# Patient Record
Sex: MALE | Race: BLACK OR AFRICAN AMERICAN | Employment: OTHER | URBAN - METROPOLITAN AREA
[De-identification: names, ages, dates, MRNs, and addresses within clinical notes are randomized per-mention and may not be internally consistent; named-entity substitution may affect disease eponyms.]

---

## 2019-04-22 ENCOUNTER — ANESTHESIA EVENT (OUTPATIENT)
Dept: ENDOSCOPY | Age: 64
End: 2019-04-22
Payer: MEDICARE

## 2019-04-22 ENCOUNTER — ANESTHESIA (OUTPATIENT)
Dept: ENDOSCOPY | Age: 64
End: 2019-04-22
Payer: MEDICARE

## 2019-04-22 ENCOUNTER — HOSPITAL ENCOUNTER (OUTPATIENT)
Age: 64
Setting detail: OUTPATIENT SURGERY
Discharge: HOME | End: 2019-04-22
Attending: COLON & RECTAL SURGERY | Admitting: COLON & RECTAL SURGERY
Payer: MEDICARE

## 2019-04-22 VITALS
WEIGHT: 265 LBS | BODY MASS INDEX: 37.1 KG/M2 | HEIGHT: 71 IN | DIASTOLIC BLOOD PRESSURE: 86 MMHG | RESPIRATION RATE: 16 BRPM | SYSTOLIC BLOOD PRESSURE: 160 MMHG | HEART RATE: 58 BPM | OXYGEN SATURATION: 98 % | TEMPERATURE: 98.4 F

## 2019-04-22 PROCEDURE — 77030009426 HC FCPS BIOP ENDOSC BSC -B: Performed by: COLON & RECTAL SURGERY

## 2019-04-22 PROCEDURE — 77030013992 HC SNR POLYP ENDOSC BSC -B: Performed by: COLON & RECTAL SURGERY

## 2019-04-22 PROCEDURE — 76040000007: Performed by: COLON & RECTAL SURGERY

## 2019-04-22 PROCEDURE — 77030003657 HC NDL SCLER BSC -B: Performed by: COLON & RECTAL SURGERY

## 2019-04-22 PROCEDURE — 77030027957 HC TBNG IRR ENDOGTR BUSS -B: Performed by: COLON & RECTAL SURGERY

## 2019-04-22 PROCEDURE — 76060000032 HC ANESTHESIA 0.5 TO 1 HR: Performed by: COLON & RECTAL SURGERY

## 2019-04-22 PROCEDURE — 88305 TISSUE EXAM BY PATHOLOGIST: CPT

## 2019-04-22 PROCEDURE — 74011250636 HC RX REV CODE- 250/636

## 2019-04-22 RX ORDER — ATROPINE SULFATE 0.1 MG/ML
0.5 INJECTION INTRAVENOUS
Status: DISCONTINUED | OUTPATIENT
Start: 2019-04-22 | End: 2019-04-22 | Stop reason: HOSPADM

## 2019-04-22 RX ORDER — HYDROCHLOROTHIAZIDE 12.5 MG/1
12.5 CAPSULE ORAL DAILY
COMMUNITY
End: 2020-04-10 | Stop reason: SDUPTHER

## 2019-04-22 RX ORDER — DEXTROMETHORPHAN/PSEUDOEPHED 2.5-7.5/.8
1.2 DROPS ORAL
Status: DISCONTINUED | OUTPATIENT
Start: 2019-04-22 | End: 2019-04-22 | Stop reason: HOSPADM

## 2019-04-22 RX ORDER — SODIUM CHLORIDE 0.9 % (FLUSH) 0.9 %
5-40 SYRINGE (ML) INJECTION EVERY 8 HOURS
Status: DISCONTINUED | OUTPATIENT
Start: 2019-04-22 | End: 2019-04-22 | Stop reason: HOSPADM

## 2019-04-22 RX ORDER — SODIUM CHLORIDE 0.9 % (FLUSH) 0.9 %
5-40 SYRINGE (ML) INJECTION AS NEEDED
Status: DISCONTINUED | OUTPATIENT
Start: 2019-04-22 | End: 2019-04-22 | Stop reason: HOSPADM

## 2019-04-22 RX ORDER — FLUMAZENIL 0.1 MG/ML
0.2 INJECTION INTRAVENOUS
Status: DISCONTINUED | OUTPATIENT
Start: 2019-04-22 | End: 2019-04-22 | Stop reason: HOSPADM

## 2019-04-22 RX ORDER — LIDOCAINE HYDROCHLORIDE 20 MG/ML
INJECTION, SOLUTION EPIDURAL; INFILTRATION; INTRACAUDAL; PERINEURAL AS NEEDED
Status: DISCONTINUED | OUTPATIENT
Start: 2019-04-22 | End: 2019-04-22 | Stop reason: HOSPADM

## 2019-04-22 RX ORDER — EPINEPHRINE 0.1 MG/ML
1 INJECTION INTRACARDIAC; INTRAVENOUS
Status: DISCONTINUED | OUTPATIENT
Start: 2019-04-22 | End: 2019-04-22 | Stop reason: HOSPADM

## 2019-04-22 RX ORDER — NALOXONE HYDROCHLORIDE 0.4 MG/ML
0.4 INJECTION, SOLUTION INTRAMUSCULAR; INTRAVENOUS; SUBCUTANEOUS
Status: DISCONTINUED | OUTPATIENT
Start: 2019-04-22 | End: 2019-04-22 | Stop reason: HOSPADM

## 2019-04-22 RX ORDER — PROPOFOL 10 MG/ML
INJECTION, EMULSION INTRAVENOUS AS NEEDED
Status: DISCONTINUED | OUTPATIENT
Start: 2019-04-22 | End: 2019-04-22 | Stop reason: HOSPADM

## 2019-04-22 RX ORDER — ERGOCALCIFEROL 1.25 MG/1
50000 CAPSULE ORAL
COMMUNITY

## 2019-04-22 RX ORDER — TRIPROLIDINE/PSEUDOEPHEDRINE 2.5MG-60MG
TABLET ORAL
COMMUNITY
End: 2020-04-09 | Stop reason: ALTCHOICE

## 2019-04-22 RX ORDER — NAPROXEN 500 MG/1
500 TABLET ORAL 2 TIMES DAILY WITH MEALS
COMMUNITY
End: 2020-04-10 | Stop reason: SDUPTHER

## 2019-04-22 RX ORDER — SODIUM CHLORIDE 9 MG/ML
INJECTION, SOLUTION INTRAVENOUS
Status: DISCONTINUED | OUTPATIENT
Start: 2019-04-22 | End: 2019-04-22 | Stop reason: HOSPADM

## 2019-04-22 RX ADMIN — PROPOFOL 100 MG: 10 INJECTION, EMULSION INTRAVENOUS at 09:02

## 2019-04-22 RX ADMIN — PROPOFOL 50 MG: 10 INJECTION, EMULSION INTRAVENOUS at 09:24

## 2019-04-22 RX ADMIN — PROPOFOL 50 MG: 10 INJECTION, EMULSION INTRAVENOUS at 09:09

## 2019-04-22 RX ADMIN — PROPOFOL 50 MG: 10 INJECTION, EMULSION INTRAVENOUS at 09:20

## 2019-04-22 RX ADMIN — PROPOFOL 50 MG: 10 INJECTION, EMULSION INTRAVENOUS at 09:06

## 2019-04-22 RX ADMIN — PROPOFOL 30 MG: 10 INJECTION, EMULSION INTRAVENOUS at 09:10

## 2019-04-22 RX ADMIN — PROPOFOL 50 MG: 10 INJECTION, EMULSION INTRAVENOUS at 09:04

## 2019-04-22 RX ADMIN — SODIUM CHLORIDE: 9 INJECTION, SOLUTION INTRAVENOUS at 08:55

## 2019-04-22 RX ADMIN — LIDOCAINE HYDROCHLORIDE 50 MG: 20 INJECTION, SOLUTION EPIDURAL; INFILTRATION; INTRACAUDAL; PERINEURAL at 09:02

## 2019-04-22 RX ADMIN — PROPOFOL 50 MG: 10 INJECTION, EMULSION INTRAVENOUS at 09:15

## 2019-04-22 NOTE — OP NOTES
Graham Manzano  [id number]  1955    Colonoscopy Operative Report    Procedure Type:   Colonoscopy --screening     Pre-operative Diagnosis:  See indication above. Post-operative Diagnosis:  See findings below. Surgeon:  Faiza Faustin MD    Referring Provider: Bill Gauthier MD    Sedation and Analgesia:  MAC anesthesia Propofol    Specimens Removed:  specimen 3.5 cm mass in the descending colon    EBL:  0 mL. Complications: None apparent. Indication For Procedure:    Screening colonoscopy    Findings:       Procedure Details:  After informed consent was obtained, the patient was taken to the endoscopy suite where standard monitoring devices were attached and intravenous access was established. Sedation was administered by the anesthetist as needed throughout the procedure. The patient was placed in the left lateral decubitus position, and inspection of the perineum revealed no significant external lesions. Digital rectal examination revealed no masses. The Olympus videocolonoscope was lubricated and inserted transanally into the rectum. It was advanced into the colon and without difficulty to the cecum, which was identified by the presence of the ileocecal valve and the appendiceal orifice. The quality of the bowel preparation was good, as was the overall visualization. Careful inspection was performed during withdrawal of the colonoscope. There were no apparent complications, and the patient appeared to have tolerated the procedure well. At its conclusion, he was transported to the recovery area in good condition. Impression:    polyp(s) #1, 3 mm in size, located in the ascending colon removed by snare cautery and retrieved for pathology  #2, 5 mm in size, located in proximal descending colon removed by snare cautery and retrieved for pathology  #3, 35 mm in size, located in the descending colon biopsied    Recommendations: --Await pathology. Regular diet. Resume normal medication(s). He will need to have a CT of abd/pelvis if cancer.  See Dr. Tao Armstrong in office in 10 days

## 2019-04-22 NOTE — ANESTHESIA POSTPROCEDURE EVALUATION
Post-Anesthesia Evaluation and Assessment Patient: Nisreen Burgos MRN: [id number]  SSN: [id number] YOB: 1955  Age: 70 y.o. Sex: male I have evaluated the patient and they are stable and ready for discharge from the PACU. Cardiovascular Function/Vital Signs Visit Vitals BP (!) 130/94 Pulse 61 Temp 36.9 °C (98.4 °F) Resp 18 Ht 5' 11\" (1.803 m) Wt 120.2 kg (265 lb) SpO2 (!) 0% BMI 36.96 kg/m² Patient is status post MAC anesthesia for Procedure(s): 
COLONOSCOPY 
ENDOSCOPIC POLYPECTOMY 
COLON BIOPSY INJECTION. Nausea/Vomiting: None Postoperative hydration reviewed and adequate. Pain: 
Pain Scale 1: Numeric (0 - 10) (04/22/19 2330) Pain Intensity 1: 0 (04/22/19 4150) Managed Neurological Status: At baseline Mental Status, Level of Consciousness: Alert and  oriented to person, place, and time Pulmonary Status:  
O2 Device: Nasal cannula (04/22/19 0901) Adequate oxygenation and airway patent Complications related to anesthesia: None Post-anesthesia assessment completed. No concerns Signed By: Viviane Gitelman, MD   
 April 22, 2019 Procedure(s): 
COLONOSCOPY 
ENDOSCOPIC POLYPECTOMY 
COLON BIOPSY INJECTION. MAC 
 
<BSHSIANPOST> Vitals Value Taken Time /94 4/22/2019  9:43 AM  
Temp Pulse 66 4/22/2019  9:46 AM  
Resp 35 4/22/2019  9:46 AM  
SpO2 0 % 4/22/2019  9:46 AM  
Vitals shown include unvalidated device data.

## 2019-04-22 NOTE — ROUTINE PROCESS
Miladys Kemp 1955 
[id number] Situation: 
Verbal report received from: [facility] Procedure: Procedure(s): 
COLONOSCOPY 
ENDOSCOPIC POLYPECTOMY 
COLON BIOPSY INJECTION Background: 
 
Preoperative diagnosis: SCREENING Postoperative diagnosis: 1. Descending colon polyp 2. Descending colon mass / tatooed 3. Cecal Polyps x 3 :  Dr. Madiha Egan Assistant(s): Endoscopy RN-1: Drew Current Endoscopy RN-2: Ambreen Milton RN Specimens:  
ID Type Source Tests Collected by Time Destination 1 : descenidng colon mass bx Preservative Colon, Descending  Charo Delatorre MD 4/22/2019 6127 Pathology 2 : cecal polyps Preservative Cecum  Charo Delatorre MD 4/22/2019 5187 Pathology 3 : proximal descending colon polyp Preservative Colon, Descending  Charo Delatorre MD 4/22/2019 4282 Pathology H. Pylori  no Assessment: 
Intra-procedure medications Intravenous fluids: NS@ Padmini Luis Enrique Vital signs stable yes Abdominal assessment: round and soft  radha Recommendation: 
Discharge patient per MD order yes . Return to floor na Family or Friend fam 
Permission to share finding with family or friend yes

## 2019-04-22 NOTE — PERIOP NOTES

## 2019-04-22 NOTE — DISCHARGE INSTRUCTIONS
Garrett Postin  [id number]  1955    COLON DISCHARGE INSTRUCTIONS  Discomfort:  Redness at IV site- apply warm compress to area; if redness or soreness persist- contact your physician  There may be a slight amount of blood passed from the rectum  Gaseous discomfort- walking, belching will help relieve any discomfort  You may not operate a vehicle for 12 hours  You may not engage in an occupation involving machinery or appliances for rest of today  You may not drink alcoholic beverages for at least 12 hours  Avoid making any critical decisions for at least 24 hour  DIET:   Regular diet. - however -  remember your colon is empty and a heavy meal will produce gas. Avoid these foods:  vegetables, fried / greasy foods, carbonated drinks for today    MEDICATIONS:         ACTIVITY:  You may resume your normal daily activities it is recommended that you spend the remainder of the day resting -  avoid any strenuous activity. CALL M.D. ANY SIGN OF:   Increasing pain, nausea, vomiting  Abdominal distension (swelling)  New increased bleeding (oral or rectal)  Fever (chills)  Pain in chest area  Bloody discharge from nose or mouth  Shortness of breath     Follow-up Instructions:   Call Trista Goodson MD if any questions or problems. Telephone # [phone]  Biopsy results will be available in  7 to10 days  Should have a repeat colonoscopy in 1 years. COLONOSCOPY FINDINGS:  Your colonoscopy showed: mass in descending colon and biopsy was taken and 3 other polyps removed. Will need to see Dr. Maximo Hayden in the office in 7-10 days to discuss.

## 2019-05-01 ENCOUNTER — HOSPITAL ENCOUNTER (OUTPATIENT)
Dept: CT IMAGING | Age: 64
Discharge: HOME | End: 2019-05-01
Attending: COLON & RECTAL SURGERY
Payer: MEDICARE

## 2019-05-01 DIAGNOSIS — C18.6 MALIGNANT NEOPLASM OF DESCENDING COLON (HCC): ICD-10-CM

## 2019-05-01 LAB — CREAT BLD-MCNC: 0.9 MG/DL (ref 0.6–1.3)

## 2019-05-01 PROCEDURE — 74177 CT ABD & PELVIS W/CONTRAST: CPT

## 2019-05-01 PROCEDURE — 74011636320 HC RX REV CODE- 636/320: Performed by: RADIOLOGY

## 2019-05-01 PROCEDURE — 71260 CT THORAX DX C+: CPT

## 2019-05-01 PROCEDURE — 82565 ASSAY OF CREATININE: CPT

## 2019-05-01 PROCEDURE — 74011000258 HC RX REV CODE- 258: Performed by: RADIOLOGY

## 2019-05-01 RX ORDER — SODIUM CHLORIDE 0.9 % (FLUSH) 0.9 %
10 SYRINGE (ML) INJECTION
Status: COMPLETED | OUTPATIENT
Start: 2019-05-01 | End: 2019-05-01

## 2019-05-01 RX ADMIN — IOHEXOL 50 ML: 240 INJECTION, SOLUTION INTRATHECAL; INTRAVASCULAR; INTRAVENOUS; ORAL at 14:46

## 2019-05-01 RX ADMIN — Medication 10 ML: at 14:46

## 2019-05-01 RX ADMIN — SODIUM CHLORIDE 100 ML: 900 INJECTION, SOLUTION INTRAVENOUS at 14:46

## 2019-05-01 RX ADMIN — IOPAMIDOL 100 ML: 755 INJECTION, SOLUTION INTRAVENOUS at 14:46

## 2020-03-30 ENCOUNTER — TELEPHONE (OUTPATIENT)
Dept: INTERNAL MEDICINE CLINIC | Age: 65
End: 2020-03-30

## 2020-03-30 NOTE — TELEPHONE ENCOUNTER
----- Message from Sheeba Romo sent at 3/30/2020 12:19 PM EDT -----  Regarding: Dr. Ruiz German: [phone]  Caller's first and last name: Jadiel Lozada  Reason for call: Caller would like to change the pt's appt on April 2, 2020 11:15 AM to a Virtual Visit.   Callback required yes/no and why: Yes  Best contact number(s): [phone], if no answer try [phone]  Details to clarify the request: N/A      Copy/paste envera

## 2020-03-31 NOTE — TELEPHONE ENCOUNTER
Left message for patient advising that his appointment has been changed to virtual and to contact the office if this does not work for him

## 2020-04-02 ENCOUNTER — VIRTUAL VISIT (OUTPATIENT)
Dept: INTERNAL MEDICINE CLINIC | Age: 65
End: 2020-04-02

## 2020-04-09 ENCOUNTER — VIRTUAL VISIT (OUTPATIENT)
Dept: INTERNAL MEDICINE CLINIC | Age: 65
End: 2020-04-09

## 2020-04-09 DIAGNOSIS — Z13.220 SCREENING FOR CHOLESTEROL LEVEL: ICD-10-CM

## 2020-04-09 DIAGNOSIS — Z13.1 SCREENING FOR DIABETES MELLITUS: ICD-10-CM

## 2020-04-09 DIAGNOSIS — Z11.59 NEED FOR HEPATITIS C SCREENING TEST: ICD-10-CM

## 2020-04-09 DIAGNOSIS — I10 ESSENTIAL HYPERTENSION: ICD-10-CM

## 2020-04-09 DIAGNOSIS — R76.8 POSITIVE HEPATITIS C ANTIBODY TEST: ICD-10-CM

## 2020-04-09 DIAGNOSIS — Z00.00 ENCOUNTER FOR MEDICAL EXAMINATION TO ESTABLISH CARE: Primary | ICD-10-CM

## 2020-04-09 NOTE — PROGRESS NOTES
Mr. Kacy Sandy is a new patient who is here to establish care. CC:  Establish Care   virtual visit      HPI:    74 yo male presenting to establish care   Patient is a poor historian  He has the following chronic medical issues   HTN: taking HCTZ 12.5mg daily for blood pressure does not check BP Regularly.  Denies CP and dyspnea    Patient has a hx of chronic OA and takes diclofenac and tizanidine as needed for pain     He has allergies and takes singulair 10mg daily     Colonoscopy  April 2019 Dr Jesus Harrison -   Impression:    polyp(s) #1, 3 mm in size, located in the ascending colon removed by snare cautery and retrieved for pathology  #2, 5 mm in size, located in proximal descending colon removed by snare cautery and retrieved for pathology  #3, 35 mm in size, located in the descending colon biopsied    Reviewed biopsy intramucosal adenoma - patient is following with Dr Jesus Harrison, will request records  Denies abdominal pain and rectal bleeding and weight loss and fever    Has eye appointment in June   Review of systems:  Constitutional: negative for fever, chills, weight loss, night sweats   Eyes : negative for vision changes, eye pain and discharge  Nose and Throat: negative for tinnitus, sore throat   Cardiovascular: negative for chest pain, palpitations and shortness of breath  Respiratory: negative for shortness of breath, cough and wheezing   Gastroinstestinal: negative for abdominal pain, nausea, vomiting, diarrhea, constipation, and blood in the stool  Musculoskeletal: see HPI  Genitourinary: negative for dysuria, nocturia, polyuria and hematuria   Neurologic: Negative for focal weakness, numbness or incoordination  Skin: negative for rash, pruritus  Hematologic: negative for easy bruising      Past Medical History:   Diagnosis Date    Asthma     HTN (hypertension)         Past Surgical History:   Procedure Laterality Date    COLONOSCOPY Left 4/22/2019    [facility]COPY    HX APPENDECTOMY  1995    HX COLONOSCOPY  2005       Allergies   Allergen Reactions    Gabapentin Other (comments)     \"Feels like he's fainting\"       Current Outpatient Medications on File Prior to Visit   Medication Sig Dispense Refill    ergocalciferol (VITAMIN D2) 50,000 unit capsule Take 50,000 Units by mouth.  vit B Cmplx 3-FA-Vit C-Biotin (NEPHRO LEANNE RX) 1- mg-mg-mcg tablet Take 1 Tab by mouth daily. No current facility-administered medications on file prior to visit. family history includes Colon Cancer in his mother. Social History     Socioeconomic History    Marital status: SINGLE     Spouse name: Not on file    Number of children: Not on file    Years of education: Not on file    Highest education level: Not on file   Occupational History    Not on file   Social Needs    Financial resource strain: Not on file    Food insecurity     Worry: Not on file     Inability: Not on file    Transportation needs     Medical: Not on file     Non-medical: Not on file   Tobacco Use    Smoking status: Former Smoker    Smokeless tobacco: Never Used   Substance and Sexual Activity    Alcohol use:  Yes     Alcohol/week: 5.0 standard drinks     Types: 5 Cans of beer per week    Drug use: Never    Sexual activity: Never   Lifestyle    Physical activity     Days per week: Not on file     Minutes per session: Not on file    Stress: Not on file   Relationships    Social connections     Talks on phone: Not on file     Gets together: Not on file     Attends Alevism service: Not on file     Active member of club or organization: Not on file     Attends meetings of clubs or organizations: Not on file     Relationship status: Not on file    Intimate partner violence     Fear of current or ex partner: Not on file     Emotionally abused: Not on file     Physically abused: Not on file     Forced sexual activity: Not on file   Other Topics Concern    Not on file   Social History Narrative    Not on file       There were no vitals taken for this visit. General:  Well appearing male no acute distress  Respiratory: no respiratory distress,  Speaking in full sentences  Musculoskeletal:  Normal gait. Skin:  No visible rash  Neuro:  A and OX4, fluent speech, cranial nerves grossly normal  Psych:  Normal affect      No results found for: WBC, WBCLT, HGBPOC, HGB, HGBP, HCTPOC, HCT, PHCT, RBCH, PLT, MCV, HGBEXT, HCTEXT, PLTEXT, HGBEXT, HCTEXT, PLTEXT  Lab Results   Component Value Date/Time    Sodium 140 06/30/2010 10:35 AM    Potassium 4.2 06/30/2010 10:35 AM    Chloride 103 06/30/2010 10:35 AM    CO2 28 06/30/2010 10:35 AM    Anion gap 9 06/30/2010 10:35 AM    Glucose 90 06/30/2010 10:35 AM    BUN 10 06/30/2010 10:35 AM    Creatinine 1.0 06/30/2010 10:35 AM    BUN/Creatinine ratio 10 (L) 06/30/2010 10:35 AM    GFR est AA >60 06/30/2010 10:35 AM    GFR est non-AA >60 06/30/2010 10:35 AM    Calcium 8.7 06/30/2010 10:35 AM     Lab Results   Component Value Date/Time    Cholesterol, total 197 06/30/2010 10:35 AM    HDL Cholesterol 35 06/30/2010 10:35 AM    LDL, calculated 117 (H) 06/30/2010 10:35 AM    VLDL, calculated 45 06/30/2010 10:35 AM    Triglyceride 225 (H) 06/30/2010 10:35 AM    CHOL/HDL Ratio 5.6 (H) 06/30/2010 10:35 AM     No results found for: TSH, TSH2, TSH3, TSHP, TSHEXT, TSHEXT  No results found for: HBA1C, HGBE8, GKD4FDRW, RUQ7XXSN, MOY4LMDH  No results found for: VITD3, XQVID2, XQVID3, XQVID, VD3RIA                Assessment and Plan:     1. Encounter for medical examination to establish care  - METABOLIC PANEL, COMPREHENSIVE  - CBC WITH AUTOMATED DIFF  - HEMOGLOBIN A1C W/O EAG  - LIPID PANEL  - HEPATITIS C AB    2. Essential hypertension  - on HCTZ 12.5mg daily   -recommend checking blood pressure with goal of less than  130/85 on average. Follow lo sodium diet. Given DASH diet guidelines. Recommend cardiovascular exercise regularly. Work on weight reduction.  Call with blood pressure readings.   - METABOLIC PANEL, COMPREHENSIVE  - CBC WITH AUTOMATED DIFF    3. Need for hepatitis C screening test  - HEPATITIS C AB    4. Screening for diabetes mellitus  - HEMOGLOBIN A1C W/O EAG    5. Screening for cholesterol level  - LIPID PANEL    6. Colonic mass - diagnosed by Dr Avelino Patel April 2019 , requesting records    patient reports he is following up with Dr Ernestine Gonzales conducted via real time video and audio telemedicine. The patient has been instructed that this meets HIPAA criteria and acknowledges and agrees to this method of visitation.     Page Mullen MD  04/14/20  1:59 PM    Page Mullen MD

## 2020-04-09 NOTE — PROGRESS NOTES
Reviewed record in preparation for visit and have obtained necessary documentation. Identified pt with two pt identifiers(name and ). Chief Complaint   Patient presents with   [facility] Due   Topic Date Due    Hepatitis C Test  1955    DTaP/Tdap/Td  (1 - Tdap) 1974    Shingles Vaccine (1 of 2) 2003    Colon Cancer Stool Test  2003    Cholesterol Test   2015    Glaucoma Screening   2018    Pneumococcal Vaccine (1 of 1 - PPSV23) 2018    Annual Well Visit  2019       Mr. Marc Ruiz has a reminder for a \"due or due soon\" health maintenance. I have asked that he discuss this further with his primary care provider for follow-up on this health maintenance. Coordination of Care Questionnaire:  :     1) Have you been to an emergency room, urgent care clinic since your last visit? no   Hospitalized since your last visit? no             2) Have you seen or consulted any other health care providers outside of [address] [place] [address] since your last visit? no  (Include any pap smears or colon screenings in this section.)    3) In the event something were to happen to you and you were unable to speak on your behalf, do you have an Advance Directive/ Living Will in place stating your wishes? NO    Do you have an Advance Directive on file? no    4) Are you interested in receiving information on Advance Directives? NO    Patient is accompanied by self I have received verbal consent from Kel Terrazas to discuss any/all medical information while they are present in the room.

## 2020-04-10 RX ORDER — FLUTICASONE PROPIONATE AND SALMETEROL 100; 50 UG/1; UG/1
1 POWDER RESPIRATORY (INHALATION) 2 TIMES DAILY
Qty: 1 INHALER | Refills: 2 | Status: SHIPPED | OUTPATIENT
Start: 2020-04-10 | End: 2020-04-27 | Stop reason: SDUPTHER

## 2020-04-10 RX ORDER — HYDROCHLOROTHIAZIDE 12.5 MG/1
12.5 CAPSULE ORAL DAILY
Qty: 30 CAP | Refills: 5 | Status: SHIPPED | OUTPATIENT
Start: 2020-04-10 | End: 2020-08-13 | Stop reason: SDUPTHER

## 2020-04-10 RX ORDER — ALBUTEROL SULFATE 90 UG/1
1 AEROSOL, METERED RESPIRATORY (INHALATION)
Qty: 1 INHALER | Refills: 1 | Status: SHIPPED | OUTPATIENT
Start: 2020-04-10 | End: 2020-06-18

## 2020-04-10 RX ORDER — MONTELUKAST SODIUM 10 MG/1
10 TABLET ORAL DAILY
Qty: 30 TAB | Refills: 5 | Status: SHIPPED | OUTPATIENT
Start: 2020-04-10 | End: 2020-08-13 | Stop reason: SDUPTHER

## 2020-04-10 RX ORDER — DICLOFENAC SODIUM 75 MG/1
75 TABLET, DELAYED RELEASE ORAL 2 TIMES DAILY
Qty: 60 TAB | Refills: 1 | Status: SHIPPED | OUTPATIENT
Start: 2020-04-10 | End: 2020-06-22

## 2020-04-10 RX ORDER — TIZANIDINE 2 MG/1
2 TABLET ORAL
Qty: 60 TAB | Refills: 5 | Status: SHIPPED | OUTPATIENT
Start: 2020-04-10 | End: 2020-08-13 | Stop reason: SDUPTHER

## 2020-04-10 NOTE — TELEPHONE ENCOUNTER
[facility], [address] Office Pool             Medication Refill     : 1955   ID numbers: n/a     Caller (if not patient): n/a   Relationship of caller (if not patient): n/a   Best contact number(s): [phone]   Name of medication and dosage if known: \"Tizanibine 4mg\"; \"Montelukast 10mg. \"; \"Diclofenac 75mg. \"; \"Hydrochlorot 25ml; \"Fluticasone spray\"; \"Fluticasone 500-50\"; \"Inhaler Zentolin hfa 90 mg\"; Is patient out of this medication (yes/no): yes   Pharmacy name: [facility] at [address] [place] [address].    Pharmacy listed in chart? (yes/no): yes   Pharmacy phone number: [phone]   Date of last visit: 20   Details to clarify the request: n/a     Copy/Paste  ROHIT

## 2020-04-14 ENCOUNTER — TELEPHONE (OUTPATIENT)
Dept: INTERNAL MEDICINE CLINIC | Age: 65
End: 2020-04-14

## 2020-04-14 NOTE — TELEPHONE ENCOUNTER
----- Message from Anthony Blackmon MD sent at 4/14/2020  1:56 PM EDT -----  Needs records from Dr Tosin Real in [facility] to plan for intramucosal adenoma seen on colonoscopy in APril 2019

## 2020-04-17 ENCOUNTER — TELEPHONE (OUTPATIENT)
Dept: INTERNAL MEDICINE CLINIC | Age: 65
End: 2020-04-17

## 2020-04-17 DIAGNOSIS — J40 BRONCHITIS: Primary | ICD-10-CM

## 2020-04-17 RX ORDER — AMOXICILLIN AND CLAVULANATE POTASSIUM 875; 125 MG/1; MG/1
1 TABLET, FILM COATED ORAL EVERY 12 HOURS
Qty: 14 TAB | Refills: 0 | Status: SHIPPED | OUTPATIENT
Start: 2020-04-17 | End: 2020-04-21 | Stop reason: ALTCHOICE

## 2020-04-17 RX ORDER — PREDNISONE 20 MG/1
40 TABLET ORAL
Qty: 10 TAB | Refills: 0 | Status: SHIPPED | OUTPATIENT
Start: 2020-04-17 | End: 2020-04-21 | Stop reason: SDUPTHER

## 2020-04-17 NOTE — TELEPHONE ENCOUNTER
Prescriptions were sent on 04/10 to [facility] should arrive tomorrow  Antibiotic and steroid sent to [address]   does he need a 2 day supply on BP meds ok to pend that to me  Thanks

## 2020-04-17 NOTE — TELEPHONE ENCOUNTER
[facility] [address] Office Pool             Caller: patient     Reason: The benzonatate (100mg) the patient was prescribed is not working, and he is still suffering a cough. He also reports that he has not received a callback regarding a refill request he had sent in for his medications: diclofenac (75 mg), fluticasone propionate 50 mg, montelukast 10 mg, hydrochlorot 25 mg, tizanidine 4 mg, ventolin HFA, and fluticasone propionate salmeterol 500/50. They were called in last Friday to be refilled at [facility], however nobody sent anything back to him. He has no medication at all, and has received no callback. [facility] [address] [address], [facility], [address] [place] [address] ([phone]) for easy pickup as soon as possible. He is suffering from asthma and bronchitis and would like a response soon.      Best contact number(s): [phone]     Copy/Paste  Concepcion

## 2020-04-17 NOTE — TELEPHONE ENCOUNTER
LVM for patient on home and cell numbers listed in the chart. [facility]  mail order pharmacy will arrive 04/18/2020. Prescriptions will be delivered by [employer] tracking number [id number]. Other prescriptions were sent to the [facility] on [address].

## 2020-04-21 ENCOUNTER — VIRTUAL VISIT (OUTPATIENT)
Dept: INTERNAL MEDICINE CLINIC | Age: 65
End: 2020-04-21

## 2020-04-21 DIAGNOSIS — J45.31 MILD PERSISTENT ASTHMA WITH ACUTE EXACERBATION: Primary | ICD-10-CM

## 2020-04-21 DIAGNOSIS — J40 BRONCHITIS: ICD-10-CM

## 2020-04-21 RX ORDER — PREDNISONE 20 MG/1
40 TABLET ORAL
Qty: 10 TAB | Refills: 0 | Status: SHIPPED | OUTPATIENT
Start: 2020-04-21 | End: 2020-06-01 | Stop reason: ALTCHOICE

## 2020-04-21 NOTE — PROGRESS NOTES
CC: Cough      HPI:    He is a 75 y.o. male who presents for evaluation of cough    Patient reports cough at night and smight at home. Bringing out clear phlegm   Had sinus infection last month and given antibiotic by patient first  Since then has cough  Using flonase 2 sprays in each nose   Also taking singulair 10mg daily   Also taking advair     Taking night time cough medicine OTC    Patient denies shortness of breath   Sometimes when talking voice is hoarse        This is an established visit conducted via telemedicine withphone only  The patient has been instructed that this meets HIPAA criteria and acknowledges and agrees to this method of visitation. Pursuant to the emergency declaration under the [facility] waiver authority and the [employer] Act, this Virtual Visit was conducted, with patient's consent, to reduce the patient's risk of exposure to COVID-19 and provide continuity of care for an established patient. ROS:  Constitutional: negative for fevers, chills, anorexia and weight loss  10 systems reviewed and negative other than HPI    Past Medical History:   Diagnosis Date    Asthma     HTN (hypertension)        Current Outpatient Medications on File Prior to Visit   Medication Sig Dispense Refill    fluticasone propion-salmeteroL (ADVAIR/WIXELA) 100-50 mcg/dose diskus inhaler Take 1 Puff by inhalation two (2) times a day. 1 Inhaler 2    hydroCHLOROthiazide (MICROZIDE) 12.5 mg capsule Take 1 Cap by mouth daily. 30 Cap 5    diclofenac EC (VOLTAREN) 75 mg EC tablet Take 1 Tab by mouth two (2) times a day. 60 Tab 1    albuterol (PROVENTIL HFA, VENTOLIN HFA, PROAIR HFA) 90 mcg/actuation inhaler Take 1 Puff by inhalation every six (6) hours as needed for Wheezing. 1 Inhaler 1    tiZANidine (ZANAFLEX) 2 mg tablet Take 1 Tab by mouth two (2) times daily as needed for Muscle Spasm(s).  60 Tab 5    montelukast (SINGULAIR) 10 mg tablet Take 1 Tab by mouth daily. 30 Tab 5    ergocalciferol (VITAMIN D2) 50,000 unit capsule Take 50,000 Units by mouth.  vit B Cmplx 3-FA-Vit C-Biotin (NEPHRO LEANNE RX) 1- mg-mg-mcg tablet Take 1 Tab by mouth daily. No current facility-administered medications on file prior to visit. Past Surgical History:   Procedure Laterality Date    COLONOSCOPY Left 4/22/2019    COLONOSCOPY performed by Clara Perez MD at [address] HX COLONOSCOPY  2005       Family History   Problem Relation Age of Onset    Colon Cancer Mother      Reviewed and no changes     Social History     Socioeconomic History    Marital status: SINGLE     Spouse name: Not on file    Number of children: Not on file    Years of education: Not on file    Highest education level: Not on file   Occupational History    Not on file   Social Needs    Financial resource strain: Not on file    Food insecurity     Worry: Not on file     Inability: Not on file    Transportation needs     Medical: Not on file     Non-medical: Not on file   Tobacco Use    Smoking status: Former Smoker    Smokeless tobacco: Never Used   Substance and Sexual Activity    Alcohol use:  Yes     Alcohol/week: 5.0 standard drinks     Types: 5 Cans of beer per week    Drug use: Never    Sexual activity: Never   Lifestyle    Physical activity     Days per week: Not on file     Minutes per session: Not on file    Stress: Not on file   Relationships    Social connections     Talks on phone: Not on file     Gets together: Not on file     Attends Anglican service: Not on file     Active member of club or organization: Not on file     Attends meetings of clubs or organizations: Not on file     Relationship status: Not on file    Intimate partner violence     Fear of current or ex partner: Not on file     Emotionally abused: Not on file     Physically abused: Not on file     Forced sexual activity: Not on file   Other Topics Concern    Not on file   Social History Narrative    Not on file          There were no vitals taken for this visit. Physical Examination:   Gen:no acute distress, she is speaking in full sentences           No results found for: WBC, WBCLT, HGBPOC, HGB, HGBP, HCTPOC, HCT, PHCT, RBCH, PLT, MCV, HGBEXT, HCTEXT, PLTEXT, HGBEXT, HCTEXT, PLTEXT  Lab Results   Component Value Date/Time    Sodium 140 06/30/2010 10:35 AM    Potassium 4.2 06/30/2010 10:35 AM    Chloride 103 06/30/2010 10:35 AM    CO2 28 06/30/2010 10:35 AM    Anion gap 9 06/30/2010 10:35 AM    Glucose 90 06/30/2010 10:35 AM    BUN 10 06/30/2010 10:35 AM    Creatinine 1.0 06/30/2010 10:35 AM    BUN/Creatinine ratio 10 (L) 06/30/2010 10:35 AM    GFR est AA >60 06/30/2010 10:35 AM    GFR est non-AA >60 06/30/2010 10:35 AM    Calcium 8.7 06/30/2010 10:35 AM     Lab Results   Component Value Date/Time    Cholesterol, total 197 06/30/2010 10:35 AM    HDL Cholesterol 35 06/30/2010 10:35 AM    LDL, calculated 117 (H) 06/30/2010 10:35 AM    VLDL, calculated 45 06/30/2010 10:35 AM    Triglyceride 225 (H) 06/30/2010 10:35 AM    CHOL/HDL Ratio 5.6 (H) 06/30/2010 10:35 AM     No results found for: TSH, TSH2, TSH3, TSHP, TSHEXT, TSHEXT  Lab Results   Component Value Date/Time    Prostate Specific Ag 0.5 06/30/2010 10:35 AM     No results found for: HBA1C, HGBE8, QTY0QVHY, WPZ3HYOF, KBY8MFYE  No results found for: VITD3, XQVID2, XQVID3, Pat Honer, VD3RIA    Lab Results   Component Value Date/Time    ALT (SGPT) 100 (H) 06/30/2010 10:35 AM    AST (SGOT) 57 (H) 06/30/2010 10:35 AM    Alk. phosphatase 95 06/30/2010 10:35 AM    Bilirubin, total 0.4 06/30/2010 10:35 AM           Assessment/Plan:        2. Mild persistent asthma with acute exacerbation  - advised to continue advair, singulair, flonase and albuterol as needed  - predniSONE (DELTASONE) 20 mg tablet; Take 40 mg by mouth daily (with breakfast). Dispense: 10 Tab;  Refill: Jacqui Woodward MD    This is an established visit conducted via real time phone only 14 minutes telemedicine. The patient has been instructed that this meets HIPAA criteria and acknowledges and agrees to this method of visitation.

## 2020-04-22 LAB
ALBUMIN SERPL-MCNC: 4 G/DL (ref 3.8–4.8)
ALBUMIN/GLOB SERPL: 1.5 {RATIO} (ref 1.2–2.2)
ALP SERPL-CCNC: 75 IU/L (ref 39–117)
ALT SERPL-CCNC: 36 IU/L (ref 0–44)
AST SERPL-CCNC: 32 IU/L (ref 0–40)
BASOPHILS # BLD AUTO: 0 X10E3/UL (ref 0–0.2)
BASOPHILS NFR BLD AUTO: 1 %
BILIRUB SERPL-MCNC: 0.5 MG/DL (ref 0–1.2)
BUN SERPL-MCNC: 7 MG/DL (ref 8–27)
BUN/CREAT SERPL: 7 (ref 10–24)
CALCIUM SERPL-MCNC: 8.7 MG/DL (ref 8.6–10.2)
CHLORIDE SERPL-SCNC: 99 MMOL/L (ref 96–106)
CHOLEST SERPL-MCNC: 208 MG/DL (ref 100–199)
CO2 SERPL-SCNC: 24 MMOL/L (ref 20–29)
CREAT SERPL-MCNC: 0.97 MG/DL (ref 0.76–1.27)
EOSINOPHIL # BLD AUTO: 0.4 X10E3/UL (ref 0–0.4)
EOSINOPHIL NFR BLD AUTO: 6 %
ERYTHROCYTE [DISTWIDTH] IN BLOOD BY AUTOMATED COUNT: 11.8 % (ref 11.6–15.4)
GLOBULIN SER CALC-MCNC: 2.6 G/DL (ref 1.5–4.5)
GLUCOSE SERPL-MCNC: 107 MG/DL (ref 65–99)
HBA1C MFR BLD: 5.4 % (ref 4.8–5.6)
HCT VFR BLD AUTO: 40 % (ref 37.5–51)
HCV AB S/CO SERPL IA: 9.4 S/CO RATIO (ref 0–0.9)
HDLC SERPL-MCNC: 44 MG/DL
HGB BLD-MCNC: 13.7 G/DL (ref 13–17.7)
IMM GRANULOCYTES # BLD AUTO: 0 X10E3/UL (ref 0–0.1)
IMM GRANULOCYTES NFR BLD AUTO: 0 %
LDLC SERPL CALC-MCNC: 141 MG/DL (ref 0–99)
LYMPHOCYTES # BLD AUTO: 2.7 X10E3/UL (ref 0.7–3.1)
LYMPHOCYTES NFR BLD AUTO: 45 %
MCH RBC QN AUTO: 34.9 PG (ref 26.6–33)
MCHC RBC AUTO-ENTMCNC: 34.3 G/DL (ref 31.5–35.7)
MCV RBC AUTO: 102 FL (ref 79–97)
MONOCYTES # BLD AUTO: 0.7 X10E3/UL (ref 0.1–0.9)
MONOCYTES NFR BLD AUTO: 11 %
NEUTROPHILS # BLD AUTO: 2.2 X10E3/UL (ref 1.4–7)
NEUTROPHILS NFR BLD AUTO: 37 %
PLATELET # BLD AUTO: 270 X10E3/UL (ref 150–450)
POTASSIUM SERPL-SCNC: 3.9 MMOL/L (ref 3.5–5.2)
PROT SERPL-MCNC: 6.6 G/DL (ref 6–8.5)
RBC # BLD AUTO: 3.92 X10E6/UL (ref 4.14–5.8)
SODIUM SERPL-SCNC: 140 MMOL/L (ref 134–144)
TRIGL SERPL-MCNC: 116 MG/DL (ref 0–149)
VLDLC SERPL CALC-MCNC: 23 MG/DL (ref 5–40)
WBC # BLD AUTO: 6 X10E3/UL (ref 3.4–10.8)

## 2020-04-23 NOTE — PROGRESS NOTES
Normal kidney and liver function   No diabetes  Screening for hep C is positive - patient may have antibody meaning immune and recovered or active disease further testing is necessary - labcor order for HepC viral load ordered

## 2020-04-27 RX ORDER — FLUTICASONE PROPIONATE AND SALMETEROL 100; 50 UG/1; UG/1
1 POWDER RESPIRATORY (INHALATION) 2 TIMES DAILY
Qty: 1 INHALER | Refills: 2 | Status: SHIPPED | OUTPATIENT
Start: 2020-04-27 | End: 2020-06-19 | Stop reason: SDUPTHER

## 2020-04-27 NOTE — TELEPHONE ENCOUNTER
[place], [address] [facility] [address] Office Pool             Medication Refill         Best contact number(s):[phone]       \" FLUTICASONE  50 MG \"       Is patient out of this medication (yes/no):  YES         [facility]  Pharmacy listed in chart     [address] [facility]

## 2020-05-08 NOTE — PROGRESS NOTES
Spoke with patient. Two pt identifiers confirmed. Patient advised of his most recent lab results per Dr. Richard Theodore. Pt verbalized understanding of information discussed w/ no further questions at this time.

## 2020-05-28 ENCOUNTER — TELEPHONE (OUTPATIENT)
Dept: INTERNAL MEDICINE CLINIC | Age: 65
End: 2020-05-28

## 2020-05-28 NOTE — TELEPHONE ENCOUNTER
Spoke with patient. Two pt identifiers confirmed. Patient states that he is having a lot of lower extremity edema. Patient states that elevated his feet does not seem to be helping. Patient states that he doesn't eat a lot of salt, but admits to eating chips, spam and canned vegetables. Patient states that he drinks water some but mostly juice, tea, beer and wine. Patient states that he has not noticed a change in his weight. Advised patient that he needs to change his diet, but I will also check with Dr. Em Serrato to see if she has any other recommendations. Pt verbalized understanding of information discussed w/ no further questions at this time.

## 2020-05-29 NOTE — TELEPHONE ENCOUNTER
MD Anaid Muñoz LPN   Caller: Unspecified Ada Hilton,  1:41 PM)             Agree with diet changes, he should be scheduled for in person visit next Tuesday      Spoke with patient. Two pt identifiers confirmed. Patient advised of the above information from Dr. Michelle Nolen. Patient offered an appointment with Dr. Michelle Nolen on 06/02/2020. Appointment accepted. Patient advised if anything changes or if unable to keep this appointment to call the office  Pt verbalized understanding of information discussed w/ no further questions at this time.

## 2020-06-02 ENCOUNTER — TELEPHONE (OUTPATIENT)
Dept: INTERNAL MEDICINE CLINIC | Age: 65
End: 2020-06-02

## 2020-06-02 ENCOUNTER — OFFICE VISIT (OUTPATIENT)
Dept: INTERNAL MEDICINE CLINIC | Age: 65
End: 2020-06-02

## 2020-06-02 NOTE — TELEPHONE ENCOUNTER
----- Message from Romie Holt sent at 2020  3:30 PM EDT -----  Regarding: Dr. Clarisa Edmond  Appointment not available    : 51      Caller's first and last name and relationship to patient (if not the patient): n/a  Best contact number: [phone]  Preferred date and time: as soon as possible inside the office  Scheduled appointment date and time: n/a  Reason for appointment: Pt says he over slept for his 20;   9am appt.   Details to clarify the request: Pt would like to reschedule for 1030am.      Copy/paste envera

## 2020-06-03 NOTE — TELEPHONE ENCOUNTER
Spoke with patient. Two pt identifiers confirmed. Patient offered an appointment with Dr. Richard Theodore on 06/30/2020. Appointment accepted. Patient advised if anything changes or if unable to keep this appointment to call the office  Pt verbalized understanding of information discussed w/ no further questions at this time.

## 2020-06-18 RX ORDER — ALBUTEROL SULFATE 90 UG/1
AEROSOL, METERED RESPIRATORY (INHALATION)
Qty: 17 G | Refills: 5 | Status: SHIPPED | OUTPATIENT
Start: 2020-06-18 | End: 2020-08-13 | Stop reason: SDUPTHER

## 2020-06-19 DIAGNOSIS — J45.31 MILD PERSISTENT ASTHMA WITH ACUTE EXACERBATION: Primary | ICD-10-CM

## 2020-06-19 RX ORDER — FLUTICASONE PROPIONATE AND SALMETEROL 100; 50 UG/1; UG/1
1 POWDER RESPIRATORY (INHALATION) 2 TIMES DAILY
Qty: 3 INHALER | Refills: 1 | Status: SHIPPED | OUTPATIENT
Start: 2020-06-19 | End: 2020-08-13 | Stop reason: SDUPTHER

## 2020-06-19 NOTE — TELEPHONE ENCOUNTER
CP: Matt Ferrer MD    Last appt: 6/2/2020  Future Appointments   Date Time Provider Anisa Tran   6/30/2020 11:00 AM Appa Mikki Mistry MD [facility] 87       Requested Prescriptions     Pending Prescriptions Disp Refills    fluticasone propion-salmeteroL (ADVAIR/WIXELA) 100-50 mcg/dose diskus inhaler 1 Inhaler 2     Sig: Take 1 Puff by inhalation two (2) times a day.

## 2020-06-22 RX ORDER — DICLOFENAC SODIUM 75 MG/1
TABLET, DELAYED RELEASE ORAL
Qty: 120 TAB | Refills: 0 | Status: SHIPPED | OUTPATIENT
Start: 2020-06-22 | End: 2020-08-13 | Stop reason: SDUPTHER

## 2020-06-24 ENCOUNTER — TELEPHONE (OUTPATIENT)
Dept: INTERNAL MEDICINE CLINIC | Age: 65
End: 2020-06-24

## 2020-06-24 NOTE — TELEPHONE ENCOUNTER
Spoke with patients wife Dimitris Phipps  Two pt identifiers confirmed. Dimitris Phipps advised that patients appointment on 06/30/2020 is for an in office visit. Nanette verbalized understanding of information discussed w/ no further questions at this time.

## 2020-06-24 NOTE — TELEPHONE ENCOUNTER
Patient's wife, Massiel Pierrely, states she needs a call back in reference to patient appt on 6/30/20 documented as Virtual Visit is supposed to be an In Office/Live appt & she would like to be seen the same day for an appt. Please call to discuss patient's appt.  Thank you

## 2020-06-30 ENCOUNTER — TELEPHONE (OUTPATIENT)
Dept: INTERNAL MEDICINE CLINIC | Age: 65
End: 2020-06-30

## 2020-06-30 NOTE — TELEPHONE ENCOUNTER
Spoke with patients wife. Two pt identifiers confirmed. Offered an appointment for the patient on 08/13/2020. Appointment accepted. Patient advised if anything changes or if unable to keep this appointment to call the office  Pt verbalized understanding of information discussed w/ no further questions at this time.

## 2020-06-30 NOTE — TELEPHONE ENCOUNTER
----- Message from Ivis Gtz sent at 6/30/2020  9:18 AM EDT -----  Regarding: Dr. Maulik Meyer first and last name: Nish Broussard      Reason for call:change appointment until July 2020      Callback required yes/no and why:yes      Best contact number(s):[phone]      Details to clarify the request: please reschedule appointment until July 2020      Ivis Gtz

## 2020-08-12 ENCOUNTER — TELEPHONE (OUTPATIENT)
Dept: INTERNAL MEDICINE CLINIC | Age: 65
End: 2020-08-12

## 2020-08-12 NOTE — TELEPHONE ENCOUNTER
----- Message from Suzan Jimenes sent at 8/12/2020  4:01 PM EDT -----  Regarding: Dr. Lilibeth Ramirez: [phone]  Caller's first and last name: Cash Bennett  Reason for call: Missed a phone call from provider.    Callback required yes/no and why: yes  Best contact number(s): [phone]  Details to clarify the request:n/a      Copy/paste envera

## 2020-08-13 ENCOUNTER — VIRTUAL VISIT (OUTPATIENT)
Dept: INTERNAL MEDICINE CLINIC | Age: 65
End: 2020-08-13
Payer: MEDICAID

## 2020-08-13 DIAGNOSIS — I10 ESSENTIAL HYPERTENSION: Primary | ICD-10-CM

## 2020-08-13 DIAGNOSIS — R76.8 POSITIVE HEPATITIS C ANTIBODY TEST: ICD-10-CM

## 2020-08-13 DIAGNOSIS — M25.531 RIGHT WRIST PAIN: ICD-10-CM

## 2020-08-13 DIAGNOSIS — J45.30 MILD PERSISTENT ASTHMA WITHOUT COMPLICATION: ICD-10-CM

## 2020-08-13 PROCEDURE — 99214 OFFICE O/P EST MOD 30 MIN: CPT | Performed by: INTERNAL MEDICINE

## 2020-08-13 RX ORDER — FLUTICASONE PROPIONATE 50 MCG
2 SPRAY, SUSPENSION (ML) NASAL DAILY
Qty: 3 BOTTLE | Refills: 3 | Status: SHIPPED | OUTPATIENT
Start: 2020-08-13 | End: 2020-08-13 | Stop reason: SDUPTHER

## 2020-08-13 RX ORDER — FLUTICASONE PROPIONATE 50 MCG
2 SPRAY, SUSPENSION (ML) NASAL DAILY
Qty: 3 BOTTLE | Refills: 3 | Status: SHIPPED | OUTPATIENT
Start: 2020-08-13

## 2020-08-13 RX ORDER — HYDROCHLOROTHIAZIDE 12.5 MG/1
12.5 CAPSULE ORAL DAILY
Qty: 90 CAP | Refills: 1 | Status: SHIPPED | OUTPATIENT
Start: 2020-08-13 | End: 2020-12-05

## 2020-08-13 RX ORDER — ALBUTEROL SULFATE 90 UG/1
AEROSOL, METERED RESPIRATORY (INHALATION)
Qty: 17 G | Refills: 5 | Status: SHIPPED | OUTPATIENT
Start: 2020-08-13

## 2020-08-13 RX ORDER — DICLOFENAC SODIUM 75 MG/1
TABLET, DELAYED RELEASE ORAL
Qty: 120 TAB | Refills: 0 | Status: SHIPPED | OUTPATIENT
Start: 2020-08-13 | End: 2020-09-10

## 2020-08-13 RX ORDER — TIZANIDINE 2 MG/1
2 TABLET ORAL
Qty: 60 TAB | Refills: 5 | Status: SHIPPED | OUTPATIENT
Start: 2020-08-13 | End: 2021-07-21 | Stop reason: SDUPTHER

## 2020-08-13 RX ORDER — FLUTICASONE PROPIONATE AND SALMETEROL 100; 50 UG/1; UG/1
1 POWDER RESPIRATORY (INHALATION) 2 TIMES DAILY
Qty: 3 INHALER | Refills: 1 | Status: SHIPPED | OUTPATIENT
Start: 2020-08-13 | End: 2020-12-05

## 2020-08-13 RX ORDER — MONTELUKAST SODIUM 10 MG/1
10 TABLET ORAL DAILY
Qty: 90 TAB | Refills: 1 | Status: SHIPPED | OUTPATIENT
Start: 2020-08-13 | End: 2020-12-05

## 2020-08-13 NOTE — PROGRESS NOTES
CC: Asthma  HTN     HPI:    He is a 77 y.o. male who presents for evaluation of chronic medical issues     He has a hx of HTN not monitoring BP \" It is fine\"  Takes HCTZ 12.5mg daily     For asthma this is stable taking singulair and Advair and PRN use of albuterol  Reports albuterol use is not frequent / unable to quantify frequency    Hepatitis C treated and cured     Hx of colon cancer - Followed by colon specialist Dr Temo Burgess due for visit in September     Complains of right wrist pain for months hurts when moves his wrist, denies swelling and redness    This is an established visit conducted via telemedicine real [facility] . The patient has been instructed that this meets HIPAA criteria and acknowledges and agrees to this method of visitation. Pursuant to the emergency declaration under the [address] [place] [address], [facility] waiver authority and the [place] APX Act, this Virtual Visit was conducted, with patient's consent, to reduce the patient's risk of exposure to COVID-19 and provide continuity of care for an established patient. ROS:  Constitutional: negative for fevers, chills, anorexia and weight loss  10 systems reviewed and negative other than HPI    Past Medical History:   Diagnosis Date    Asthma     HTN (hypertension)        Current Outpatient Medications on File Prior to Visit   Medication Sig Dispense Refill    ergocalciferol (VITAMIN D2) 50,000 unit capsule Take 50,000 Units by mouth.  vit B Cmplx 3-FA-Vit C-Biotin (NEPHRO LEANNE RX) 1- mg-mg-mcg tablet Take 1 Tab by mouth daily. No current facility-administered medications on file prior to visit.         Past Surgical History:   Procedure Laterality Date    COLONOSCOPY Left 4/22/2019    COLONOSCOPY performed by Carlitos Olmos MD at [address] [place] [address] HX COLONOSCOPY  2005       Family History   Problem Relation Age of Onset    Colon Cancer Mother      Reviewed and no changes     Social History     Socioeconomic History    Marital status: SINGLE     Spouse name: Not on file    Number of children: Not on file    Years of education: Not on file    Highest education level: Not on file   Occupational History    Not on file   Social Needs    Financial resource strain: Not on file    Food insecurity     Worry: Not on file     Inability: Not on file    Transportation needs     Medical: Not on file     Non-medical: Not on file   Tobacco Use    Smoking status: Former Smoker    Smokeless tobacco: Never Used   Substance and Sexual Activity    Alcohol use: Yes     Alcohol/week: 5.0 standard drinks     Types: 5 Cans of beer per week    Drug use: Never    Sexual activity: Never   Lifestyle    Physical activity     Days per week: Not on file     Minutes per session: Not on file    Stress: Not on file   Relationships    Social connections     Talks on phone: Not on file     Gets together: Not on file     Attends Faith service: Not on file     Active member of club or organization: Not on file     Attends meetings of clubs or organizations: Not on file     Relationship status: Not on file    Intimate partner violence     Fear of current or ex partner: Not on file     Emotionally abused: Not on file     Physically abused: Not on file     Forced sexual activity: Not on file   Other Topics Concern    Not on file   Social History Narrative    Not on file          There were no vitals taken for this visit.     Physical Examination:   Gen:no acute distress, she is speaking in full sentences           Lab Results   Component Value Date/Time    WBC 6.0 04/21/2020 11:07 AM    HGB 13.7 04/21/2020 11:07 AM    HCT 40.0 04/21/2020 11:07 AM    PLATELET 922 82/67/5451 11:07 AM     (H) 04/21/2020 11:07 AM     Lab Results   Component Value Date/Time    Sodium 140 04/21/2020 11:07 AM    Potassium 3.9 04/21/2020 11:07 AM    Chloride 99 04/21/2020 11:07 AM    CO2 24 04/21/2020 11:07 AM    Anion gap 9 06/30/2010 10:35 AM    Glucose 107 (H) 04/21/2020 11:07 AM    BUN 7 (L) 04/21/2020 11:07 AM    Creatinine 0.97 04/21/2020 11:07 AM    BUN/Creatinine ratio 7 (L) 04/21/2020 11:07 AM    GFR est AA 94 04/21/2020 11:07 AM    GFR est non-AA 81 04/21/2020 11:07 AM    Calcium 8.7 04/21/2020 11:07 AM     Lab Results   Component Value Date/Time    Cholesterol, total 208 (H) 04/21/2020 11:07 AM    HDL Cholesterol 44 04/21/2020 11:07 AM    LDL, calculated 141 (H) 04/21/2020 11:07 AM    VLDL, calculated 23 04/21/2020 11:07 AM    Triglyceride 116 04/21/2020 11:07 AM    CHOL/HDL Ratio 5.6 (H) 06/30/2010 10:35 AM     No results found for: TSH, TSH2, TSH3, TSHP, TSHEXT, TSHEXT  Lab Results   Component Value Date/Time    Prostate Specific Ag 0.5 06/30/2010 10:35 AM     Lab Results   Component Value Date/Time    Hemoglobin A1c 5.4 04/21/2020 11:07 AM     No results found for: VITD3, XQVID2, XQVID3, Kelly Sears, VD3RIA    Lab Results   Component Value Date/Time    ALT (SGPT) 36 04/21/2020 11:07 AM    Alk. phosphatase 75 04/21/2020 11:07 AM    Bilirubin, total 0.5 04/21/2020 11:07 AM           Assessment/Plan:      1. Essential hypertension  - reports controlled   - hydroCHLOROthiazide (MICROZIDE) 12.5 mg capsule; Take 1 Cap by mouth daily. Dispense: 90 Cap; Refill: 1    2. Positive hepatitis C antibody test  Reports treated and cured     3. Right wrist pain  - REFERRAL TO ORTHOPEDICS  - diclofenac EC (VOLTAREN) 75 mg EC tablet; TAKE 1 TABLET BY MOUTH  TWICE DAILY  Dispense: 120 Tab; Refill: 0  - tiZANidine (ZANAFLEX) 2 mg tablet; Take 1 Tab by mouth two (2) times daily as needed for Muscle Spasm(s). Dispense: 60 Tab; Refill: 5    4. Mild persistent asthma  Stable   - fluticasone propion-salmeteroL (ADVAIR/WIXELA) 100-50 mcg/dose diskus inhaler; Take 1 Puff by inhalation two (2) times a day. Dispense: 3 Inhaler;  Refill: 1  - albuterol (PROVENTIL HFA, VENTOLIN HFA, PROAIR HFA) 90 mcg/actuation inhaler; USE 1 INHALATION BY MOUTH  EVERY 6 HOURS AS NEEDED FOR WHEEZING  Dispense: 17 g; Refill: 5  - montelukast (SINGULAIR) 10 mg tablet; Take 1 Tab by mouth daily. Dispense: 90 Tab;  Refill: 1    Virtual video visit    In person exam in one month for medicare wellness, and BP check and exam      Pee Conrad MD

## 2020-08-13 NOTE — PROGRESS NOTES
Reviewed record in preparation for visit and have obtained necessary documentation. Identified pt with two pt identifiers(name and ). Chief Complaint   [facility] presents with   [facility]   Topic Date Due    DTaP/Tdap/Td  (1 - Tdap) 1974    Shingles Vaccine (1 of 2) 2003    Glaucoma Screening   2018    Pneumococcal Vaccine (1 of 1 - PPSV23) 2018    Annual Well Visit  2019    Colonoscopy  2020    Flu Vaccine  2020       Mr. Darshan Napoles has a reminder for a \"due or due soon\" health maintenance. I have asked that he discuss this further with his primary care provider for follow-up on this health maintenance. Coordination of Care Questionnaire:  :     1) Have you been to an emergency room, urgent care clinic since your last visit? no   Hospitalized since your last visit? no             2) Have you seen or consulted any other health care providers outside of [address] [place] [address] since your last visit? no  (Include any pap smears or colon screenings in this section.)    3) In the event something were to happen to you and you were unable to speak on your behalf, do you have an Advance Directive/ Living Will in place stating your wishes? NO    Do you have an Advance Directive on file? no    4) Are you interested in receiving information on Advance Directives? NO    Patient is accompanied by self I have received verbal consent from Dev Deal to discuss any/all medical information while they are present in the room.

## 2020-08-13 NOTE — TELEPHONE ENCOUNTER
Spoke with [facility]. Two pt identifiers confirmed. Advised that the missed call was a reminder call for patients appointment today with Dr. Milly Tam. Nanette verbalized understanding of information discussed w/ no further questions at this time.

## 2020-09-10 DIAGNOSIS — M25.531 RIGHT WRIST PAIN: ICD-10-CM

## 2020-09-10 RX ORDER — DICLOFENAC SODIUM 75 MG/1
TABLET, DELAYED RELEASE ORAL
Qty: 120 TAB | Refills: 5 | Status: SHIPPED | OUTPATIENT
Start: 2020-09-10 | End: 2022-01-13

## 2020-12-05 DIAGNOSIS — J45.30 MILD PERSISTENT ASTHMA WITHOUT COMPLICATION: ICD-10-CM

## 2020-12-05 DIAGNOSIS — I10 ESSENTIAL HYPERTENSION: ICD-10-CM

## 2020-12-05 RX ORDER — FLUTICASONE PROPIONATE AND SALMETEROL 100; 50 UG/1; UG/1
POWDER RESPIRATORY (INHALATION)
Qty: 180 EACH | Refills: 3 | Status: SHIPPED | OUTPATIENT
Start: 2020-12-05

## 2020-12-05 RX ORDER — HYDROCHLOROTHIAZIDE 12.5 MG/1
CAPSULE ORAL
Qty: 90 CAP | Refills: 3 | Status: SHIPPED | OUTPATIENT
Start: 2020-12-05

## 2020-12-05 RX ORDER — MONTELUKAST SODIUM 10 MG/1
TABLET ORAL
Qty: 90 TAB | Refills: 3 | Status: SHIPPED | OUTPATIENT
Start: 2020-12-05

## 2021-03-04 NOTE — PROGRESS NOTES
Reviewed record in preparation for visit and have obtained necessary documentation. Identified pt with two pt identifiers(name and ). Chief Complaint   Patient presents with    Cough       Health Maintenance Due   Topic Date Due    Hepatitis C Test  1955    DTaP/Tdap/Td  (1 - Tdap) 1974    Shingles Vaccine (1 of 2) 2003    Cholesterol Test   2015    Glaucoma Screening   2018    Pneumococcal Vaccine (1 of 1 - PPSV23) 2018    Annual Well Visit  2019    Colonoscopy  2020       Mr. Flor Wilson has a reminder for a \"due or due soon\" health maintenance. I have asked that he discuss this further with his primary care provider for follow-up on this health maintenance. Coordination of Care Questionnaire:  :     1) Have you been to an emergency room, urgent care clinic since your last visit? no   Hospitalized since your last visit? no             2) Have you seen or consulted any other health care providers outside of [address] [place] [address] since your last visit? no  (Include any pap smears or colon screenings in this section.)    3) In the event something were to happen to you and you were unable to speak on your behalf, do you have an Advance Directive/ Living Will in place stating your wishes? NO    Do you have an Advance Directive on file? no    4) Are you interested in receiving information on Advance Directives? NO    Patient is accompanied by self I have received verbal consent from Jacobo Dunlap to discuss any/all medical information while they are present in the room. Never smoker

## 2021-07-21 DIAGNOSIS — M25.531 RIGHT WRIST PAIN: ICD-10-CM

## 2021-07-21 RX ORDER — TIZANIDINE 2 MG/1
2 TABLET ORAL
Qty: 60 TABLET | Refills: 5 | Status: SHIPPED | OUTPATIENT
Start: 2021-07-21

## 2021-07-21 NOTE — TELEPHONE ENCOUNTER
Future Appointments:  No future appointments. Last Appointment With Me:  Visit date not found     Requested Prescriptions     Pending Prescriptions Disp Refills    tiZANidine (ZANAFLEX) 2 mg tablet 60 Tablet 5     Sig: Take 1 Tablet by mouth two (2) times daily as needed for Muscle Spasm(s).

## 2022-01-13 DIAGNOSIS — M25.531 RIGHT WRIST PAIN: ICD-10-CM

## 2022-01-13 RX ORDER — DICLOFENAC SODIUM 75 MG/1
TABLET, DELAYED RELEASE ORAL
Qty: 180 TABLET | Refills: 3 | Status: SHIPPED | OUTPATIENT
Start: 2022-01-13

## 2022-02-05 ENCOUNTER — APPOINTMENT (OUTPATIENT)
Dept: CT IMAGING | Age: 67
End: 2022-02-05
Attending: EMERGENCY MEDICINE
Payer: MEDICAID

## 2022-02-05 ENCOUNTER — HOSPITAL ENCOUNTER (EMERGENCY)
Age: 67
Discharge: HOME | End: 2022-02-05
Attending: EMERGENCY MEDICINE
Payer: MEDICAID

## 2022-02-05 VITALS
HEART RATE: 71 BPM | OXYGEN SATURATION: 97 % | DIASTOLIC BLOOD PRESSURE: 78 MMHG | WEIGHT: 245 LBS | RESPIRATION RATE: 12 BRPM | BODY MASS INDEX: 34.3 KG/M2 | SYSTOLIC BLOOD PRESSURE: 162 MMHG | HEIGHT: 71 IN | TEMPERATURE: 98.6 F

## 2022-02-05 DIAGNOSIS — J45.30 MILD PERSISTENT ASTHMA WITHOUT COMPLICATION: ICD-10-CM

## 2022-02-05 DIAGNOSIS — K62.5 RECTAL BLEEDING: ICD-10-CM

## 2022-02-05 DIAGNOSIS — R10.84 ABDOMINAL PAIN, GENERALIZED: Primary | ICD-10-CM

## 2022-02-05 DIAGNOSIS — F10.929 ALCOHOLIC INTOXICATION WITH COMPLICATION (HCC): ICD-10-CM

## 2022-02-05 LAB
ALBUMIN SERPL-MCNC: 3.7 G/DL (ref 3.5–5)
ALBUMIN/GLOB SERPL: 1 {RATIO} (ref 1.1–2.2)
ALP SERPL-CCNC: 77 U/L (ref 45–117)
ALT SERPL-CCNC: 65 U/L (ref 12–78)
ANION GAP SERPL CALC-SCNC: 9 MMOL/L (ref 5–15)
APTT PPP: 25.4 SEC (ref 22.1–31)
AST SERPL-CCNC: 50 U/L (ref 15–37)
BASOPHILS # BLD: 0 K/UL (ref 0–0.1)
BASOPHILS NFR BLD: 0 % (ref 0–1)
BILIRUB SERPL-MCNC: 0.3 MG/DL (ref 0.2–1)
BUN SERPL-MCNC: 10 MG/DL (ref 6–20)
BUN/CREAT SERPL: 13 (ref 12–20)
CALCIUM SERPL-MCNC: 8.3 MG/DL (ref 8.5–10.1)
CHLORIDE SERPL-SCNC: 109 MMOL/L (ref 97–108)
CO2 SERPL-SCNC: 24 MMOL/L (ref 21–32)
CREAT SERPL-MCNC: 0.75 MG/DL (ref 0.7–1.3)
DIFFERENTIAL METHOD BLD: ABNORMAL
EOSINOPHIL # BLD: 0.2 K/UL (ref 0–0.4)
EOSINOPHIL NFR BLD: 2 % (ref 0–7)
ERYTHROCYTE [DISTWIDTH] IN BLOOD BY AUTOMATED COUNT: 11.8 % (ref 11.5–14.5)
ETHANOL SERPL-MCNC: 324 MG/DL
GLOBULIN SER CALC-MCNC: 3.8 G/DL (ref 2–4)
GLUCOSE SERPL-MCNC: 113 MG/DL (ref 65–100)
HCT VFR BLD AUTO: 42.3 % (ref 36.6–50.3)
HEMOCCULT STL QL: NEGATIVE
HGB BLD-MCNC: 14.7 G/DL (ref 12.1–17)
IMM GRANULOCYTES # BLD AUTO: 0 K/UL (ref 0–0.04)
IMM GRANULOCYTES NFR BLD AUTO: 0 % (ref 0–0.5)
INR PPP: 1 (ref 0.9–1.1)
LYMPHOCYTES # BLD: 2.6 K/UL (ref 0.8–3.5)
LYMPHOCYTES NFR BLD: 37 % (ref 12–49)
MCH RBC QN AUTO: 34.3 PG (ref 26–34)
MCHC RBC AUTO-ENTMCNC: 34.8 G/DL (ref 30–36.5)
MCV RBC AUTO: 98.8 FL (ref 80–99)
MONOCYTES # BLD: 0.5 K/UL (ref 0–1)
MONOCYTES NFR BLD: 7 % (ref 5–13)
NEUTS SEG # BLD: 3.7 K/UL (ref 1.8–8)
NEUTS SEG NFR BLD: 54 % (ref 32–75)
NRBC # BLD: 0 K/UL (ref 0–0.01)
NRBC BLD-RTO: 0 PER 100 WBC
PLATELET # BLD AUTO: 185 K/UL (ref 150–400)
PMV BLD AUTO: 10.2 FL (ref 8.9–12.9)
POTASSIUM SERPL-SCNC: 3.7 MMOL/L (ref 3.5–5.1)
PROT SERPL-MCNC: 7.5 G/DL (ref 6.4–8.2)
PROTHROMBIN TIME: 10.1 SEC (ref 9–11.1)
RBC # BLD AUTO: 4.28 M/UL (ref 4.1–5.7)
SODIUM SERPL-SCNC: 142 MMOL/L (ref 136–145)
THERAPEUTIC RANGE,PTTT: NORMAL SECS (ref 58–77)
WBC # BLD AUTO: 7 K/UL (ref 4.1–11.1)

## 2022-02-05 PROCEDURE — 85025 COMPLETE CBC W/AUTO DIFF WBC: CPT

## 2022-02-05 PROCEDURE — 82272 OCCULT BLD FECES 1-3 TESTS: CPT

## 2022-02-05 PROCEDURE — 86900 BLOOD TYPING SEROLOGIC ABO: CPT

## 2022-02-05 PROCEDURE — 85730 THROMBOPLASTIN TIME PARTIAL: CPT

## 2022-02-05 PROCEDURE — 74011000636 HC RX REV CODE- 636: Performed by: EMERGENCY MEDICINE

## 2022-02-05 PROCEDURE — 96374 THER/PROPH/DIAG INJ IV PUSH: CPT

## 2022-02-05 PROCEDURE — 74178 CT ABD&PLV WO CNTR FLWD CNTR: CPT

## 2022-02-05 PROCEDURE — 82077 ASSAY SPEC XCP UR&BREATH IA: CPT

## 2022-02-05 PROCEDURE — C9113 INJ PANTOPRAZOLE SODIUM, VIA: HCPCS | Performed by: EMERGENCY MEDICINE

## 2022-02-05 PROCEDURE — 99284 EMERGENCY DEPT VISIT MOD MDM: CPT

## 2022-02-05 PROCEDURE — 36415 COLL VENOUS BLD VENIPUNCTURE: CPT

## 2022-02-05 PROCEDURE — 74011000250 HC RX REV CODE- 250: Performed by: EMERGENCY MEDICINE

## 2022-02-05 PROCEDURE — 85610 PROTHROMBIN TIME: CPT

## 2022-02-05 PROCEDURE — 80053 COMPREHEN METABOLIC PANEL: CPT

## 2022-02-05 PROCEDURE — 74011250636 HC RX REV CODE- 250/636: Performed by: EMERGENCY MEDICINE

## 2022-02-05 RX ORDER — PANTOPRAZOLE SODIUM 40 MG/1
40 TABLET, DELAYED RELEASE ORAL DAILY
Qty: 20 TABLET | Refills: 0 | Status: SHIPPED | OUTPATIENT
Start: 2022-02-05 | End: 2022-02-25

## 2022-02-05 RX ORDER — SODIUM CHLORIDE 0.9 % (FLUSH) 0.9 %
5-40 SYRINGE (ML) INJECTION EVERY 8 HOURS
Status: DISCONTINUED | OUTPATIENT
Start: 2022-02-05 | End: 2022-02-05 | Stop reason: HOSPADM

## 2022-02-05 RX ORDER — SODIUM CHLORIDE 0.9 % (FLUSH) 0.9 %
5-40 SYRINGE (ML) INJECTION AS NEEDED
Status: DISCONTINUED | OUTPATIENT
Start: 2022-02-05 | End: 2022-02-05 | Stop reason: HOSPADM

## 2022-02-05 RX ORDER — PANTOPRAZOLE SODIUM 40 MG/1
40 TABLET, DELAYED RELEASE ORAL DAILY
Qty: 20 TABLET | Refills: 0 | Status: SHIPPED | OUTPATIENT
Start: 2022-02-05 | End: 2022-02-05 | Stop reason: SDUPTHER

## 2022-02-05 RX ORDER — PANTOPRAZOLE SODIUM 40 MG/10ML
40 INJECTION, POWDER, LYOPHILIZED, FOR SOLUTION INTRAVENOUS
Status: COMPLETED | OUTPATIENT
Start: 2022-02-05 | End: 2022-02-05

## 2022-02-05 RX ADMIN — SODIUM CHLORIDE, PRESERVATIVE FREE 10 ML: 5 INJECTION INTRAVENOUS at 16:01

## 2022-02-05 RX ADMIN — PANTOPRAZOLE SODIUM 40 MG: 40 INJECTION, POWDER, FOR SOLUTION INTRAVENOUS at 15:59

## 2022-02-05 RX ADMIN — IOPAMIDOL 100 ML: 755 INJECTION, SOLUTION INTRAVENOUS at 17:02

## 2022-02-05 RX ADMIN — SODIUM CHLORIDE, PRESERVATIVE FREE 10 ML: 5 INJECTION INTRAVENOUS at 16:02

## 2022-02-05 NOTE — ED PROVIDER NOTES
EMERGENCY DEPARTMENT HISTORY AND PHYSICAL EXAM      Date: 2/5/2022  Patient Name: Glo Hagen    History of Presenting Illness     Chief Complaint   Patient presents with    Melena     pt c/o abdonal pain with dark red blood in stool since Thurs. [place] c/o vomitting x4 with blood in vomit. Hx of abdominal cancer, pt does no know if he just hac tumor removed or a colectomy       History Provided By: Patient    HPI: Glo Hagen, 74 y.o. male with PMHx significant for hypertension, asthma, and intra-abdominal tumor removal (? colon cancer) presents to the ED with abdominal pain and blood in his stool for the last 2 days. Patient is a poor historian. He reports constipation and blood in his stool this morning. He had a another episode about 2 days ago. Reports nausea and vomiting (2 times today) with reported red blood in emesis as well. Reports moderate lower abdominal pain that is crampy in nature. Denies any fevers or chills. Denies any shortness of breath, chest pain, cough. Admits to drinking alcohol today. Appears intoxicated. Denies being on any  anticoagulation or aspirin. PCP: Beverly Patel MD    No current facility-administered medications on file prior to encounter. Current Outpatient Medications on File Prior to Encounter   Medication Sig Dispense Refill    diclofenac EC (VOLTAREN) 75 mg EC tablet TAKE 1 TABLET BY MOUTH  TWICE DAILY 180 Tablet 3    tiZANidine (ZANAFLEX) 2 mg tablet Take 1 Tablet by mouth two (2) times daily as needed for Muscle Spasm(s). 60 Tablet 5    montelukast (SINGULAIR) 10 mg tablet TAKE 1 TABLET BY MOUTH  DAILY 90 Tab 3    Wixela Inhub 100-50 mcg/dose diskus inhaler TAKE 1 INHALATION BY MOUTH  TWICE DAILY 180 Each 3    hydroCHLOROthiazide (MICROZIDE) 12.5 mg capsule TAKE 1 CAPSULE BY MOUTH  DAILY 90 Cap 3    fluticasone propionate (FLONASE) 50 mcg/actuation nasal spray 2 Sprays by Both Nostrils route daily.  3 Bottle 3    albuterol (PROVENTIL HFA, VENTOLIN HFA, PROAIR HFA) 90 mcg/actuation inhaler USE 1 INHALATION BY MOUTH  EVERY 6 HOURS AS NEEDED FOR WHEEZING 17 g 5    ergocalciferol (VITAMIN D2) 50,000 unit capsule Take 50,000 Units by mouth.  vit B Cmplx 3-FA-Vit C-Biotin (NEPHRO LEANNE RX) 1- mg-mg-mcg tablet Take 1 Tab by mouth daily. Past History     Past Medical History:  Past Medical History:   Diagnosis Date    Asthma     HTN (hypertension)        Past Surgical History:  Past Surgical History:   Procedure Laterality Date    COLONOSCOPY Left 4/22/2019    COLONOSCOPY performed by Ollie Small MD at [facility] ENDOSCOPY    HX [address] HX COLONOSCOPY  2005       Family History:  Family History   Problem Relation Age of Onset    Colon Cancer Mother        Social History:  Social History     Tobacco Use    Smoking status: Former Smoker    Smokeless tobacco: Never Used   Substance Use Topics    Alcohol use: Yes     Alcohol/week: 5.0 standard drinks     Types: 5 Cans of beer per week    Drug use: Never       Allergies: Allergies   Allergen Reactions    Gabapentin Other (comments)     \"Feels like he's fainting\"         Review of Systems   Review of Systems   Constitutional: Negative for chills and fever. HENT: Negative for congestion, rhinorrhea and sore throat. Eyes: Negative for visual disturbance. Respiratory: Negative for cough, chest tightness, shortness of breath and wheezing. Cardiovascular: Negative for chest pain and palpitations. Gastrointestinal: Positive for abdominal pain, blood in stool, constipation, nausea and vomiting. Negative for diarrhea and rectal pain. Genitourinary: Negative for dysuria, flank pain and hematuria. Musculoskeletal: Negative for back pain, myalgias and neck pain. Skin: Negative for rash and wound. Neurological: Negative for dizziness, syncope, light-headedness and headaches. Psychiatric/Behavioral: Negative for confusion. The patient is not nervous/anxious. All other systems reviewed and are negative. Physical Exam    General appearance -overweight, well appearing, and in no distress  Eyes - pupils equal and reactive, extraocular eye movements intact  ENT - mucous membranes moist, pharynx normal without lesions  Neck - supple, no significant adenopathy; non-tender to palpation  Chest - clear to auscultation, no wheezes, rales or rhonchi; non-tender to palpation  Heart - normal rate and regular rhythm, S1 and S2 normal, no murmurs noted  Abdomen - soft,, nondistended, no masses or organomegaly  Musculoskeletal - no joint tenderness, deformity or swelling; normal ROM  Extremities - peripheral pulses normal, no pedal edema  Skin - normal coloration and turgor, no rashes  Neurological - alert, oriented x3, normal speech, no focal findings or movement disorder noted    Diagnostic Study Results     Labs -     Recent Results (from the past 12 hour(s))   CBC WITH AUTOMATED DIFF    Collection Time: 02/05/22  2:55 PM   Result Value Ref Range    WBC 7.0 4.1 - 11.1 K/uL    RBC 4.28 4.10 - 5.70 M/uL    HGB 14.7 12.1 - 17.0 g/dL    HCT 42.3 36.6 - 50.3 %    MCV 98.8 80.0 - 99.0 FL    MCH 34.3 (H) 26.0 - 34.0 PG    MCHC 34.8 30.0 - 36.5 g/dL    RDW 11.8 11.5 - 14.5 %    PLATELET 946 043 - 549 K/uL    MPV 10.2 8.9 - 12.9 FL    NRBC 0.0 0  WBC    ABSOLUTE NRBC 0.00 0.00 - 0.01 K/uL    NEUTROPHILS 54 32 - 75 %    LYMPHOCYTES 37 12 - 49 %    MONOCYTES 7 5 - 13 %    EOSINOPHILS 2 0 - 7 %    BASOPHILS 0 0 - 1 %    IMMATURE GRANULOCYTES 0 0.0 - 0.5 %    ABS. NEUTROPHILS 3.7 1.8 - 8.0 K/UL    ABS. LYMPHOCYTES 2.6 0.8 - 3.5 K/UL    ABS. MONOCYTES 0.5 0.0 - 1.0 K/UL    ABS. EOSINOPHILS 0.2 0.0 - 0.4 K/UL    ABS. BASOPHILS 0.0 0.0 - 0.1 K/UL    ABS. IMM.  GRANS. 0.0 0.00 - 0.04 K/UL    DF AUTOMATED     METABOLIC PANEL, COMPREHENSIVE    Collection Time: 02/05/22  2:55 PM   Result Value Ref Range    Sodium 142 136 - 145 mmol/L    Potassium 3.7 3.5 - 5.1 mmol/L    Chloride 109 (H) 97 - 108 mmol/L    CO2 24 21 - 32 mmol/L    Anion gap 9 5 - 15 mmol/L    Glucose 113 (H) 65 - 100 mg/dL    BUN 10 6 - 20 MG/DL    Creatinine 0.75 0.70 - 1.30 MG/DL    BUN/Creatinine ratio 13 12 - 20      GFR est AA >60 >60 ml/min/1.73m2    GFR est non-AA >60 >60 ml/min/1.73m2    Calcium 8.3 (L) 8.5 - 10.1 MG/DL    Bilirubin, total 0.3 0.2 - 1.0 MG/DL    ALT (SGPT) 65 12 - 78 U/L    AST (SGOT) 50 (H) 15 - 37 U/L    Alk. phosphatase 77 45 - 117 U/L    Protein, total 7.5 6.4 - 8.2 g/dL    Albumin 3.7 3.5 - 5.0 g/dL    Globulin 3.8 2.0 - 4.0 g/dL    A-G Ratio 1.0 (L) 1.1 - 2.2     PROTHROMBIN TIME + INR    Collection Time: 02/05/22  2:55 PM   Result Value Ref Range    INR 1.0 0.9 - 1.1      Prothrombin time 10.1 9.0 - 11.1 sec   PTT    Collection Time: 02/05/22  2:55 PM   Result Value Ref Range    aPTT 25.4 22.1 - 31.0 sec    aPTT, therapeutic range     58.0 - 77.0 SECS   OCCULT BLOOD, STOOL    Collection Time: 02/05/22  3:44 PM   Result Value Ref Range    Occult blood, stool Negative NEG         Radiologic Studies -   CT ABD PELV W CONT    (Results Pending)     CT Results  (Last 48 hours)    None        CXR Results  (Last 48 hours)    None            Medical Decision Making   I am the first provider for this patient. I reviewed the vital signs, available nursing notes, past medical history, past surgical history, family history and social history. Vital Signs-Reviewed the patient's vital signs. Patient Vitals for the past 12 hrs:   Temp Pulse Resp BP SpO2   02/05/22 1422 98.6 °F (37 °C) 71 12 (!) 162/78 97 %       Records Reviewed: Nursing Notes and Old Medical Records    Provider Notes (Medical Decision Making):   Differential diagnosis: Diverticulosis, diverticulitis, AVM, anemia  We will check CBC, CMP, UA, coags, type and screen. Will treat with Protonix and obtain abdominal pelvis CT. ED Course:   Initial assessment performed.  The patients presenting problems have been discussed, and they are in agreement with the care plan formulated and outlined with them. I have encouraged them to ask questions as they arise throughout their visit. Progress Notes:  ED Course as of 02/06/22 1956   Sat Feb 05, 2022   1611 Case discussed with Dr. Radha Stokes ([facility] ED physician) who will follow up on CT scan and on unresulted labs including cmp and etoh. Hemoglobin is normal.  Stool occult is negative.   [AO]      ED Course User Index  [AO] Adelaide Montes De Oca MD       Disposition:  NE home    PLAN:  1. Discharge Medication List as of 2/5/2022  6:26 PM        2. Follow-up Information     Follow up With Specialties Details Why Contact Info    Raj Wall MD Colon and Rectal Surgery Call today  [address] [facility]  Critical access hospital [address]  [phone]      Fallon Barron MD Gastroenterology Schedule an appointment as soon as possible for a visit   [address] [place] [address]  [phone]      [facility] EMERGENCY DEPT Emergency Medicine  If symptoms worsen [address]  [address] [facility]  [phone]        Return to ED if worse     Diagnosis     Clinical Impression:   1. Abdominal pain, generalized    2. Rectal bleeding    3. Mild persistent asthma without complication    4.  Alcoholic intoxication with complication ([address]

## 2022-02-06 LAB
ABO + RH BLD: NORMAL
BLOOD GROUP ANTIBODIES SERPL: NORMAL
SPECIMEN EXP DATE BLD: NORMAL

## 2023-05-12 RX ORDER — MONTELUKAST SODIUM 10 MG/1
1 TABLET ORAL DAILY
COMMUNITY
Start: 2020-12-05

## 2023-05-12 RX ORDER — FLUTICASONE PROPIONATE AND SALMETEROL 100; 50 UG/1; UG/1
POWDER RESPIRATORY (INHALATION)
COMMUNITY
Start: 2020-12-05

## 2023-05-12 RX ORDER — DICLOFENAC SODIUM 75 MG/1
1 TABLET, DELAYED RELEASE ORAL 2 TIMES DAILY
COMMUNITY
Start: 2022-01-13

## 2023-05-12 RX ORDER — FLUTICASONE PROPIONATE 50 MCG
2 SPRAY, SUSPENSION (ML) NASAL DAILY
COMMUNITY
Start: 2020-08-13

## 2023-05-12 RX ORDER — TIZANIDINE 2 MG/1
TABLET ORAL 2 TIMES DAILY PRN
COMMUNITY
Start: 2021-07-21

## 2023-05-12 RX ORDER — ALBUTEROL SULFATE 90 UG/1
AEROSOL, METERED RESPIRATORY (INHALATION)
COMMUNITY
Start: 2020-08-13

## 2023-05-12 RX ORDER — HYDROCHLOROTHIAZIDE 12.5 MG/1
1 CAPSULE, GELATIN COATED ORAL DAILY
COMMUNITY
Start: 2020-12-05

## 2023-05-12 RX ORDER — ERGOCALCIFEROL 1.25 MG/1
CAPSULE ORAL
COMMUNITY

## 2023-12-03 ENCOUNTER — APPOINTMENT (OUTPATIENT)
Facility: HOSPITAL | Age: 68
End: 2023-12-03
Payer: MEDICAID

## 2023-12-03 ENCOUNTER — HOSPITAL ENCOUNTER (EMERGENCY)
Facility: HOSPITAL | Age: 68
Discharge: HOME | End: 2023-12-03
Attending: STUDENT IN AN ORGANIZED HEALTH CARE EDUCATION/TRAINING PROGRAM
Payer: MEDICAID

## 2023-12-03 VITALS
HEART RATE: 67 BPM | SYSTOLIC BLOOD PRESSURE: 127 MMHG | DIASTOLIC BLOOD PRESSURE: 68 MMHG | TEMPERATURE: 98.7 F | RESPIRATION RATE: 18 BRPM | BODY MASS INDEX: 33.61 KG/M2 | OXYGEN SATURATION: 98 % | HEIGHT: 71 IN | WEIGHT: 240.08 LBS

## 2023-12-03 DIAGNOSIS — J06.9 VIRAL URI WITH COUGH: Primary | ICD-10-CM

## 2023-12-03 LAB
FLUAV AG NPH QL IA: NEGATIVE
FLUBV AG NOSE QL IA: NEGATIVE
SARS-COV-2 RDRP RESP QL NAA+PROBE: NOT DETECTED
SOURCE: NORMAL

## 2023-12-03 PROCEDURE — 71046 X-RAY EXAM CHEST 2 VIEWS: CPT

## 2023-12-03 PROCEDURE — 87804 INFLUENZA ASSAY W/OPTIC: CPT

## 2023-12-03 PROCEDURE — 99284 EMERGENCY DEPT VISIT MOD MDM: CPT

## 2023-12-03 PROCEDURE — 87635 SARS-COV-2 COVID-19 AMP PRB: CPT

## 2023-12-03 RX ORDER — FLUTICASONE PROPIONATE 50 MCG
2 SPRAY, SUSPENSION (ML) NASAL DAILY
Qty: 16 G | Refills: 0 | Status: SHIPPED | OUTPATIENT
Start: 2023-12-03 | End: 2023-12-03 | Stop reason: SDUPTHER

## 2023-12-03 RX ORDER — FLUTICASONE PROPIONATE 50 MCG
2 SPRAY, SUSPENSION (ML) NASAL DAILY
Qty: 16 G | Refills: 0 | Status: SHIPPED | OUTPATIENT
Start: 2023-12-03

## 2023-12-03 RX ORDER — BENZONATATE 100 MG/1
100 CAPSULE ORAL 2 TIMES DAILY PRN
Qty: 20 CAPSULE | Refills: 0 | Status: SHIPPED | OUTPATIENT
Start: 2023-12-03 | End: 2023-12-03 | Stop reason: SDUPTHER

## 2023-12-03 RX ORDER — BENZONATATE 100 MG/1
100 CAPSULE ORAL 2 TIMES DAILY PRN
Qty: 20 CAPSULE | Refills: 0 | Status: SHIPPED | OUTPATIENT
Start: 2023-12-03 | End: 2023-12-13

## 2023-12-03 RX ORDER — GUAIFENESIN 600 MG/1
600 TABLET, EXTENDED RELEASE ORAL 2 TIMES DAILY
Qty: 30 TABLET | Refills: 0 | Status: SHIPPED | OUTPATIENT
Start: 2023-12-03 | End: 2023-12-03 | Stop reason: SDUPTHER

## 2023-12-03 RX ORDER — GUAIFENESIN 600 MG/1
600 TABLET, EXTENDED RELEASE ORAL 2 TIMES DAILY
Qty: 30 TABLET | Refills: 0 | Status: SHIPPED | OUTPATIENT
Start: 2023-12-03 | End: 2023-12-18

## 2023-12-03 ASSESSMENT — ENCOUNTER SYMPTOMS: COUGH: 1

## 2023-12-03 ASSESSMENT — PAIN - FUNCTIONAL ASSESSMENT: PAIN_FUNCTIONAL_ASSESSMENT: NONE - DENIES PAIN

## 2023-12-03 NOTE — ED TRIAGE NOTES
Pt arrives from home for congestion, cough, body aches, and chills starting a week ago.  Also reports some weakness and dizziness

## 2023-12-03 NOTE — DISCHARGE INSTRUCTIONS
Discussed visit today. Please follow-up with your PCP by calling and scheduling an appointment. Will send prescriptions for Mucinex, Flonase and tessalon perles to help with the cough. Return to the ER with any worsening of symptoms.

## 2023-12-03 NOTE — ED NOTES
Patient discharged by [facility] PA with papers in hand.       Gentry Eubanks LPN  61/41/75 [id number]

## 2023-12-03 NOTE — ED PROVIDER NOTES
[facility] EMERGENCY DEP  EMERGENCY DEPARTMENT ENCOUNTER      Pt Name: Susi Linda  MRN: [id number]  [address] [facility] 1955  Date of evaluation: 12/3/2023  Provider: Carlos Gonzales PA-C    CHIEF COMPLAINT       Chief Complaint   Patient presents with    Cough    Generalized Body Aches         HISTORY OF PRESENT ILLNESS    Patient is an 77 y.o. male with history of asthma and hypertension who presents to the ER with reports of congestion, cough, body aches, and chills that started last Monday. Patient reports he initially had some weakness and dizziness with standing, but that has resolved. Patient has not taken any medication prior to arrival. Patient denies chest pain, shortness of breath, abdominal pain, diarrhea or constipation, nausea or vomiting, headache, lightheadedness, or fever. Patient reports alcohol use, denies smoking/vaping or illicit drug use. Nursing Notes were reviewed. REVIEW OF SYSTEMS       Review of Systems   Constitutional:  Positive for chills. HENT:  Positive for congestion. Respiratory:  Positive for cough. All other systems reviewed and are negative.         PAST MEDICAL HISTORY     Past Medical History:   Diagnosis Date    Asthma     HTN (hypertension)          SURGICAL HISTORY       Past Surgical History:   Procedure Laterality Date    APPENDECTOMY  1995    COLONOSCOPY  2005    COLONOSCOPY Left 4/22/2019    COLONOSCOPY performed by Jerald Qiu MD at [address] [place] [address] Medication List as of 12/3/2023 12:41 PM        CONTINUE these medications which have NOT CHANGED    Details   albuterol sulfate HFA (PROVENTIL;VENTOLIN;PROAIR) 108 (90 Base) MCG/ACT inhaler USE 1 INHALATION BY MOUTH  EVERY 6 HOURS AS NEEDED FOR WHEEZINGHistorical Med      diclofenac (VOLTAREN) 75 MG EC tablet Take 1 tablet by mouth 2 times dailyHistorical Med      ergocalciferol (ERGOCALCIFEROL) 1.25 MG (12923 UT) capsule Take by mouthHistorical Med      !! fluticasone

## 2024-01-08 ENCOUNTER — APPOINTMENT (OUTPATIENT)
Facility: HOSPITAL | Age: 69
End: 2024-01-08
Payer: MEDICARE

## 2024-01-08 ENCOUNTER — HOSPITAL ENCOUNTER (EMERGENCY)
Facility: HOSPITAL | Age: 69
Discharge: HOME | End: 2024-01-08
Attending: STUDENT IN AN ORGANIZED HEALTH CARE EDUCATION/TRAINING PROGRAM
Payer: MEDICARE

## 2024-01-08 VITALS
BODY MASS INDEX: 35.34 KG/M2 | HEART RATE: 88 BPM | RESPIRATION RATE: 16 BRPM | DIASTOLIC BLOOD PRESSURE: 58 MMHG | WEIGHT: 252.43 LBS | TEMPERATURE: 97.5 F | OXYGEN SATURATION: 98 % | SYSTOLIC BLOOD PRESSURE: 110 MMHG | HEIGHT: 71 IN

## 2024-01-08 DIAGNOSIS — J06.9 ACUTE UPPER RESPIRATORY INFECTION: Primary | ICD-10-CM

## 2024-01-08 LAB
DEPRECATED S PYO AG THROAT QL EIA: NEGATIVE
FLUAV AG NPH QL IA: NEGATIVE
FLUBV AG NOSE QL IA: NEGATIVE
SARS-COV-2 RDRP RESP QL NAA+PROBE: NOT DETECTED
SOURCE: NORMAL

## 2024-01-08 PROCEDURE — 99283 EMERGENCY DEPT VISIT LOW MDM: CPT

## 2024-01-08 PROCEDURE — 87804 INFLUENZA ASSAY W/OPTIC: CPT

## 2024-01-08 PROCEDURE — 87880 STREP A ASSAY W/OPTIC: CPT

## 2024-01-08 PROCEDURE — 71046 X-RAY EXAM CHEST 2 VIEWS: CPT

## 2024-01-08 PROCEDURE — 87635 SARS-COV-2 COVID-19 AMP PRB: CPT

## 2024-01-08 PROCEDURE — 87070 CULTURE OTHR SPECIMN AEROBIC: CPT

## 2024-01-08 PROCEDURE — 6370000000 HC RX 637 (ALT 250 FOR IP)

## 2024-01-08 RX ORDER — LIDOCAINE HYDROCHLORIDE 20 MG/ML
10 SOLUTION OROPHARYNGEAL
Status: COMPLETED | OUTPATIENT
Start: 2024-01-08 | End: 2024-01-08

## 2024-01-08 RX ADMIN — Medication 10 ML: at 14:19

## 2024-01-08 ASSESSMENT — PAIN DESCRIPTION - DESCRIPTORS: DESCRIPTORS: SORE

## 2024-01-08 ASSESSMENT — PAIN - FUNCTIONAL ASSESSMENT: PAIN_FUNCTIONAL_ASSESSMENT: 0-10

## 2024-01-08 ASSESSMENT — PAIN SCALES - GENERAL: PAINLEVEL_OUTOF10: 7

## 2024-01-08 ASSESSMENT — PAIN DESCRIPTION - LOCATION: LOCATION: THROAT

## 2024-01-08 NOTE — ED PROVIDER NOTES
[facility] EMERGENCY DEP  EMERGENCY DEPARTMENT ENCOUNTER      Pt Name: Obi Flores  MRN: [id number]  Birthdate 1955  Date of evaluation: 1/8/2024  Provider: Paolo Zhu PA-C    CHIEF COMPLAINT       Chief Complaint   Patient presents with    Cough    Pharyngitis         HISTORY OF PRESENT ILLNESS   (Location/Symptom, Timing/Onset, Context/Setting, Quality, Duration, Modifying Factors, Severity)  Note limiting factors.   68 year old male reports to ED with complaints of slightly productive cough, sore throat, and fever/chills over past 4 days with 2 episodes of diarrhea today.  Denies shortness of breath, chest pain, abd pain, or vomiting.  States he is eating and drinking well since symptoms began.            Review of External Medical Records:     Nursing Notes were reviewed.    REVIEW OF SYSTEMS    (2-9 systems for level 4, 10 or more for level 5)     Review of Systems   Constitutional:  Negative for activity change.   HENT:  Negative for trouble swallowing.    Respiratory:  Negative for chest tightness, shortness of breath and wheezing.    Neurological:  Negative for dizziness, weakness and numbness.       Except as noted above the remainder of the review of systems was reviewed and negative.       PAST MEDICAL HISTORY     Past Medical History:   Diagnosis Date    Asthma     HTN (hypertension)          SURGICAL HISTORY       Past Surgical History:   Procedure Laterality Date    APPENDECTOMY  1995    COLONOSCOPY  2005    COLONOSCOPY Left 4/22/2019    COLONOSCOPY performed by Shirley Curiel MD at [facility] ENDOSCOPY         CURRENT MEDICATIONS       Discharge Medication List as of 1/8/2024  2:34 PM        CONTINUE these medications which have NOT CHANGED    Details   !! fluticasone (FLONASE) 50 MCG/ACT nasal spray 2 sprays by Each Nostril route daily, Disp-16 g, R-0Normal      albuterol sulfate HFA (PROVENTIL;VENTOLIN;PROAIR) 108 (90 Base) MCG/ACT inhaler USE 1 INHALATION BY MOUTH  EVERY 6 HOURS AS NEEDED FOR

## 2024-01-08 NOTE — DISCHARGE INSTRUCTIONS
You tested negative for COVID, influenza, and your strep came back negative as well.  We are culturing your strep and if it returns positive in the next day or 2, you will be called and placed on the appropriate antibiotics.  Your chest x-ray also did not show any concerning abnormalities.  Is recommended to take Tylenol as needed for pain/fever and ensure you stay hydrated.  If symptoms worsen or new concerning symptoms arise, please report to the nearest emergency department.    Thank you for allowing us to provide you with medical care today.  We realize that you have many choices for your emergency care needs.  We thank you for choosing [facility].  Please choose us in the future for any continued health care needs.     The exam and treatment you received in the Emergency Department were for an emergent problem and are not intended as complete care. It is important that you follow up with a doctor, nurse practitioner, or physician's assistant for ongoing care. If your symptoms worsen or you do not improve as expected and you are unable to reach your usual health care provider, you should return to the Emergency Department.  We are available 24 hours a day.     Please make an appointment with your health care provider(s) for follow up of your Emergency Department visit.  Take this sheet with you when you go to your follow-up visit.

## 2024-01-08 NOTE — ED TRIAGE NOTES
Patient arrives from home for cough and sore throat since Friday. Patient ambulatory to triage with steady gait. Reports fevers/chills.

## 2024-01-09 ASSESSMENT — ENCOUNTER SYMPTOMS
SHORTNESS OF BREATH: 0
CHEST TIGHTNESS: 0
WHEEZING: 0
TROUBLE SWALLOWING: 0

## 2024-01-10 LAB
BACTERIA SPEC CULT: NORMAL
SERVICE CMNT-IMP: NORMAL

## 2024-03-12 ENCOUNTER — APPOINTMENT (OUTPATIENT)
Facility: HOSPITAL | Age: 69
DRG: 194 | End: 2024-03-12
Payer: MEDICARE

## 2024-03-12 ENCOUNTER — HOSPITAL ENCOUNTER (INPATIENT)
Facility: HOSPITAL | Age: 69
LOS: 2 days | Discharge: HOME | DRG: 194 | End: 2024-03-14
Attending: STUDENT IN AN ORGANIZED HEALTH CARE EDUCATION/TRAINING PROGRAM | Admitting: FAMILY MEDICINE
Payer: MEDICARE

## 2024-03-12 DIAGNOSIS — J18.9 PNEUMONIA OF RIGHT LOWER LOBE DUE TO INFECTIOUS ORGANISM: Primary | ICD-10-CM

## 2024-03-12 DIAGNOSIS — E80.6 HYPERBILIRUBINEMIA: ICD-10-CM

## 2024-03-12 DIAGNOSIS — R65.20 SEVERE SEPSIS (HCC): ICD-10-CM

## 2024-03-12 DIAGNOSIS — A41.9 SEVERE SEPSIS (HCC): ICD-10-CM

## 2024-03-12 LAB
ALBUMIN SERPL-MCNC: 2.9 G/DL (ref 3.5–5)
ALBUMIN/GLOB SERPL: 0.6 (ref 1.1–2.2)
ALP SERPL-CCNC: 98 U/L (ref 45–117)
ALT SERPL-CCNC: 39 U/L (ref 12–78)
ANION GAP SERPL CALC-SCNC: 4 MMOL/L (ref 5–15)
APPEARANCE UR: CLEAR
AST SERPL-CCNC: 28 U/L (ref 15–37)
BACTERIA URNS QL MICRO: NEGATIVE /HPF
BASOPHILS # BLD: 0 K/UL (ref 0–0.1)
BASOPHILS NFR BLD: 0 % (ref 0–1)
BILIRUB SERPL-MCNC: 3 MG/DL (ref 0.2–1)
BILIRUB UR QL CFM: NEGATIVE
BUN SERPL-MCNC: 15 MG/DL (ref 6–20)
BUN/CREAT SERPL: 12 (ref 12–20)
CALCIUM SERPL-MCNC: 8.9 MG/DL (ref 8.5–10.1)
CHLORIDE SERPL-SCNC: 100 MMOL/L (ref 97–108)
CO2 SERPL-SCNC: 26 MMOL/L (ref 21–32)
COLOR UR: ABNORMAL
COMMENT:: NORMAL
CREAT SERPL-MCNC: 1.26 MG/DL (ref 0.7–1.3)
DIFFERENTIAL METHOD BLD: ABNORMAL
EOSINOPHIL # BLD: 0 K/UL (ref 0–0.4)
EOSINOPHIL NFR BLD: 0 % (ref 0–7)
EPITH CASTS URNS QL MICRO: ABNORMAL /LPF
ERYTHROCYTE [DISTWIDTH] IN BLOOD BY AUTOMATED COUNT: 12.6 % (ref 11.5–14.5)
FLUAV AG NPH QL IA: NEGATIVE
FLUBV AG NOSE QL IA: NEGATIVE
GLOBULIN SER CALC-MCNC: 4.7 G/DL (ref 2–4)
GLUCOSE SERPL-MCNC: 106 MG/DL (ref 65–100)
GLUCOSE UR STRIP.AUTO-MCNC: NEGATIVE MG/DL
HCT VFR BLD AUTO: 36.8 % (ref 36.6–50.3)
HGB BLD-MCNC: 12.9 G/DL (ref 12.1–17)
HGB UR QL STRIP: NEGATIVE
HYALINE CASTS URNS QL MICRO: ABNORMAL /LPF (ref 0–5)
IMM GRANULOCYTES # BLD AUTO: 0.2 K/UL (ref 0–0.04)
IMM GRANULOCYTES NFR BLD AUTO: 1 % (ref 0–0.5)
KETONES UR QL STRIP.AUTO: NEGATIVE MG/DL
LACTATE SERPL-SCNC: 1.2 MMOL/L (ref 0.4–2)
LEUKOCYTE ESTERASE UR QL STRIP.AUTO: ABNORMAL
LIPASE SERPL-CCNC: 25 U/L (ref 13–75)
LYMPHOCYTES # BLD: 2 K/UL (ref 0.8–3.5)
LYMPHOCYTES NFR BLD: 9 % (ref 12–49)
MCH RBC QN AUTO: 34.2 PG (ref 26–34)
MCHC RBC AUTO-ENTMCNC: 35.1 G/DL (ref 30–36.5)
MCV RBC AUTO: 97.6 FL (ref 80–99)
MONOCYTES # BLD: 2 K/UL (ref 0–1)
MONOCYTES NFR BLD: 9 % (ref 5–13)
NEUTS SEG # BLD: 17.8 K/UL (ref 1.8–8)
NEUTS SEG NFR BLD: 81 % (ref 32–75)
NITRITE UR QL STRIP.AUTO: NEGATIVE
NRBC # BLD: 0 K/UL (ref 0–0.01)
NRBC BLD-RTO: 0 PER 100 WBC
PH UR STRIP: 6 (ref 5–8)
PLATELET # BLD AUTO: 236 K/UL (ref 150–400)
PMV BLD AUTO: 9 FL (ref 8.9–12.9)
POTASSIUM SERPL-SCNC: 3.4 MMOL/L (ref 3.5–5.1)
PROCALCITONIN SERPL-MCNC: 6.8 NG/ML
PROT SERPL-MCNC: 7.6 G/DL (ref 6.4–8.2)
PROT UR STRIP-MCNC: NEGATIVE MG/DL
RBC # BLD AUTO: 3.77 M/UL (ref 4.1–5.7)
RBC #/AREA URNS HPF: ABNORMAL /HPF (ref 0–5)
RBC MORPH BLD: ABNORMAL
SARS-COV-2 RDRP RESP QL NAA+PROBE: NOT DETECTED
SODIUM SERPL-SCNC: 130 MMOL/L (ref 136–145)
SOURCE: NORMAL
SP GR UR REFRACTOMETRY: 1.01 (ref 1–1.03)
SPECIMEN HOLD: NORMAL
SPECIMEN HOLD: NORMAL
UROBILINOGEN UR QL STRIP.AUTO: 2 EU/DL (ref 0.2–1)
WBC # BLD AUTO: 22 K/UL (ref 4.1–11.1)
WBC URNS QL MICRO: ABNORMAL /HPF (ref 0–4)

## 2024-03-12 PROCEDURE — 84145 PROCALCITONIN (PCT): CPT

## 2024-03-12 PROCEDURE — 99285 EMERGENCY DEPT VISIT HI MDM: CPT

## 2024-03-12 PROCEDURE — 96375 TX/PRO/DX INJ NEW DRUG ADDON: CPT

## 2024-03-12 PROCEDURE — 83690 ASSAY OF LIPASE: CPT

## 2024-03-12 PROCEDURE — 74177 CT ABD & PELVIS W/CONTRAST: CPT

## 2024-03-12 PROCEDURE — 6360000004 HC RX CONTRAST MEDICATION: Performed by: STUDENT IN AN ORGANIZED HEALTH CARE EDUCATION/TRAINING PROGRAM

## 2024-03-12 PROCEDURE — 36415 COLL VENOUS BLD VENIPUNCTURE: CPT

## 2024-03-12 PROCEDURE — 1100000000 HC RM PRIVATE

## 2024-03-12 PROCEDURE — 85025 COMPLETE CBC W/AUTO DIFF WBC: CPT

## 2024-03-12 PROCEDURE — 76705 ECHO EXAM OF ABDOMEN: CPT

## 2024-03-12 PROCEDURE — 96374 THER/PROPH/DIAG INJ IV PUSH: CPT

## 2024-03-12 PROCEDURE — 87040 BLOOD CULTURE FOR BACTERIA: CPT

## 2024-03-12 PROCEDURE — 81001 URINALYSIS AUTO W/SCOPE: CPT

## 2024-03-12 PROCEDURE — 6360000002 HC RX W HCPCS: Performed by: STUDENT IN AN ORGANIZED HEALTH CARE EDUCATION/TRAINING PROGRAM

## 2024-03-12 PROCEDURE — 6360000002 HC RX W HCPCS: Performed by: FAMILY MEDICINE

## 2024-03-12 PROCEDURE — 87804 INFLUENZA ASSAY W/OPTIC: CPT

## 2024-03-12 PROCEDURE — 2580000003 HC RX 258: Performed by: FAMILY MEDICINE

## 2024-03-12 PROCEDURE — 2500000003 HC RX 250 WO HCPCS: Performed by: STUDENT IN AN ORGANIZED HEALTH CARE EDUCATION/TRAINING PROGRAM

## 2024-03-12 PROCEDURE — 87635 SARS-COV-2 COVID-19 AMP PRB: CPT

## 2024-03-12 PROCEDURE — 2580000003 HC RX 258: Performed by: STUDENT IN AN ORGANIZED HEALTH CARE EDUCATION/TRAINING PROGRAM

## 2024-03-12 PROCEDURE — 83605 ASSAY OF LACTIC ACID: CPT

## 2024-03-12 PROCEDURE — 80053 COMPREHEN METABOLIC PANEL: CPT

## 2024-03-12 RX ORDER — ENOXAPARIN SODIUM 100 MG/ML
30 INJECTION SUBCUTANEOUS 2 TIMES DAILY
Status: DISCONTINUED | OUTPATIENT
Start: 2024-03-12 | End: 2024-03-14 | Stop reason: HOSPADM

## 2024-03-12 RX ORDER — SODIUM CHLORIDE 9 MG/ML
INJECTION, SOLUTION INTRAVENOUS PRN
Status: DISCONTINUED | OUTPATIENT
Start: 2024-03-12 | End: 2024-03-14 | Stop reason: HOSPADM

## 2024-03-12 RX ORDER — ALBUTEROL SULFATE 2.5 MG/3ML
2.5 SOLUTION RESPIRATORY (INHALATION) EVERY 6 HOURS PRN
Status: DISCONTINUED | OUTPATIENT
Start: 2024-03-12 | End: 2024-03-14 | Stop reason: HOSPADM

## 2024-03-12 RX ORDER — ACETAMINOPHEN 325 MG/1
650 TABLET ORAL EVERY 6 HOURS PRN
Status: DISCONTINUED | OUTPATIENT
Start: 2024-03-12 | End: 2024-03-13

## 2024-03-12 RX ORDER — SODIUM CHLORIDE 0.9 % (FLUSH) 0.9 %
5-40 SYRINGE (ML) INJECTION PRN
Status: DISCONTINUED | OUTPATIENT
Start: 2024-03-12 | End: 2024-03-14 | Stop reason: HOSPADM

## 2024-03-12 RX ORDER — ONDANSETRON 4 MG/1
4 TABLET, ORALLY DISINTEGRATING ORAL EVERY 8 HOURS PRN
Status: DISCONTINUED | OUTPATIENT
Start: 2024-03-12 | End: 2024-03-14 | Stop reason: HOSPADM

## 2024-03-12 RX ORDER — SODIUM CHLORIDE 9 MG/ML
INJECTION, SOLUTION INTRAVENOUS CONTINUOUS
Status: DISCONTINUED | OUTPATIENT
Start: 2024-03-12 | End: 2024-03-14 | Stop reason: HOSPADM

## 2024-03-12 RX ORDER — FLUTICASONE PROPIONATE 50 MCG
2 SPRAY, SUSPENSION (ML) NASAL DAILY
Status: DISCONTINUED | OUTPATIENT
Start: 2024-03-13 | End: 2024-03-14 | Stop reason: HOSPADM

## 2024-03-12 RX ORDER — FLUTICASONE PROPIONATE AND SALMETEROL 100; 50 UG/1; UG/1
1 POWDER RESPIRATORY (INHALATION) 2 TIMES DAILY
Status: DISCONTINUED | OUTPATIENT
Start: 2024-03-12 | End: 2024-03-12 | Stop reason: CLARIF

## 2024-03-12 RX ORDER — HYDROCHLOROTHIAZIDE 25 MG/1
12.5 TABLET ORAL DAILY
Status: DISCONTINUED | OUTPATIENT
Start: 2024-03-13 | End: 2024-03-13

## 2024-03-12 RX ORDER — SODIUM CHLORIDE 0.9 % (FLUSH) 0.9 %
5-40 SYRINGE (ML) INJECTION EVERY 12 HOURS SCHEDULED
Status: DISCONTINUED | OUTPATIENT
Start: 2024-03-12 | End: 2024-03-14 | Stop reason: HOSPADM

## 2024-03-12 RX ORDER — TIZANIDINE 2 MG/1
2 TABLET ORAL 2 TIMES DAILY PRN
Status: DISCONTINUED | OUTPATIENT
Start: 2024-03-12 | End: 2024-03-14 | Stop reason: HOSPADM

## 2024-03-12 RX ORDER — ONDANSETRON 2 MG/ML
4 INJECTION INTRAMUSCULAR; INTRAVENOUS EVERY 6 HOURS PRN
Status: DISCONTINUED | OUTPATIENT
Start: 2024-03-12 | End: 2024-03-14 | Stop reason: HOSPADM

## 2024-03-12 RX ORDER — ACETAMINOPHEN 650 MG/1
650 SUPPOSITORY RECTAL EVERY 6 HOURS PRN
Status: DISCONTINUED | OUTPATIENT
Start: 2024-03-12 | End: 2024-03-13

## 2024-03-12 RX ORDER — MONTELUKAST SODIUM 10 MG/1
10 TABLET ORAL DAILY
Status: DISCONTINUED | OUTPATIENT
Start: 2024-03-13 | End: 2024-03-14 | Stop reason: HOSPADM

## 2024-03-12 RX ORDER — 0.9 % SODIUM CHLORIDE 0.9 %
1000 INTRAVENOUS SOLUTION INTRAVENOUS ONCE
Status: COMPLETED | OUTPATIENT
Start: 2024-03-12 | End: 2024-03-12

## 2024-03-12 RX ORDER — POLYETHYLENE GLYCOL 3350 17 G/17G
17 POWDER, FOR SOLUTION ORAL DAILY PRN
Status: DISCONTINUED | OUTPATIENT
Start: 2024-03-12 | End: 2024-03-14 | Stop reason: HOSPADM

## 2024-03-12 RX ADMIN — WATER 2000 MG: 1 INJECTION INTRAMUSCULAR; INTRAVENOUS; SUBCUTANEOUS at 19:09

## 2024-03-12 RX ADMIN — SODIUM CHLORIDE: 9 INJECTION, SOLUTION INTRAVENOUS at 21:45

## 2024-03-12 RX ADMIN — SODIUM CHLORIDE 1000 ML: 9 INJECTION, SOLUTION INTRAVENOUS at 18:23

## 2024-03-12 RX ADMIN — IOPAMIDOL 100 ML: 755 INJECTION, SOLUTION INTRAVENOUS at 17:55

## 2024-03-12 RX ADMIN — DOXYCYCLINE 100 MG: 100 INJECTION, POWDER, LYOPHILIZED, FOR SOLUTION INTRAVENOUS at 19:13

## 2024-03-12 RX ADMIN — ARFORMOTEROL TARTRATE: 15 SOLUTION RESPIRATORY (INHALATION) at 21:45

## 2024-03-12 RX ADMIN — ENOXAPARIN SODIUM 30 MG: 100 INJECTION SUBCUTANEOUS at 21:45

## 2024-03-12 RX ADMIN — Medication 10 ML: at 21:38

## 2024-03-12 ASSESSMENT — PAIN DESCRIPTION - ORIENTATION: ORIENTATION: RIGHT;UPPER

## 2024-03-12 ASSESSMENT — PAIN SCALES - GENERAL
PAINLEVEL_OUTOF10: 5
PAINLEVEL_OUTOF10: 4

## 2024-03-12 ASSESSMENT — ENCOUNTER SYMPTOMS
ABDOMINAL PAIN: 1
COUGH: 1

## 2024-03-12 ASSESSMENT — PAIN - FUNCTIONAL ASSESSMENT
PAIN_FUNCTIONAL_ASSESSMENT: 0-10
PAIN_FUNCTIONAL_ASSESSMENT: 0-10

## 2024-03-12 ASSESSMENT — PAIN DESCRIPTION - PAIN TYPE
TYPE: ACUTE PAIN
TYPE: ACUTE PAIN

## 2024-03-12 ASSESSMENT — PAIN DESCRIPTION - DESCRIPTORS
DESCRIPTORS: ACHING
DESCRIPTORS: ACHING;DISCOMFORT

## 2024-03-12 ASSESSMENT — PAIN DESCRIPTION - FREQUENCY: FREQUENCY: CONTINUOUS

## 2024-03-12 ASSESSMENT — PAIN DESCRIPTION - LOCATION
LOCATION: ABDOMEN
LOCATION: ABDOMEN

## 2024-03-12 ASSESSMENT — PAIN DESCRIPTION - ONSET: ONSET: ON-GOING

## 2024-03-12 NOTE — ED TRIAGE NOTES
Pt referred by [facility] for RUQ abd pain and hypotension. /10 on arrival. +dizziness upon standing.

## 2024-03-12 NOTE — ED NOTES
4:03 PM  I have evaluated the patient as the Provider in Rapid Medical Evaluation (RME). I have reviewed his vital signs and the triage nurse assessment. I have talked with the patient and any available family and advised that I am the provider in triage and have ordered the appropriate study to initiate their work up based on the clinical presentation during my assessment. I have advised that the patient will be accommodated in the Main ED as soon as possible. I have also requested to contact the triage nurse or myself immediately if the patient experiences any changes in their condition during this brief waiting period.      Obi Flores is a 68 y.o. male with history of  has a past medical history of Asthma and HTN (hypertension). who presents from home to [facility] ED with cc of RUQ pain and hypotension. Reports nausea without emesis. Patient reports feeling dizzy upon standing. Symptoms began last night. He was sent in by [facility]. Previous appendectomy.             PCP: Obi Shetty MD    There are no other complaints, changes or physical findings at this time.    FENG Cisse Tara E, PA-C  03/12/24 [id number]

## 2024-03-13 LAB
ALBUMIN SERPL-MCNC: 2.9 G/DL (ref 3.5–5)
ALBUMIN/GLOB SERPL: 0.8 (ref 1.1–2.2)
ALP SERPL-CCNC: 87 U/L (ref 45–117)
ALT SERPL-CCNC: 38 U/L (ref 12–78)
ANION GAP SERPL CALC-SCNC: 7 MMOL/L (ref 5–15)
APTT PPP: 35.6 SEC (ref 22.1–31)
AST SERPL-CCNC: 22 U/L (ref 15–37)
BASOPHILS # BLD: 0 K/UL (ref 0–0.1)
BASOPHILS NFR BLD: 0 % (ref 0–1)
BILIRUB SERPL-MCNC: 1.8 MG/DL (ref 0.2–1)
BUN SERPL-MCNC: 12 MG/DL (ref 6–20)
BUN/CREAT SERPL: 13 (ref 12–20)
CALCIUM SERPL-MCNC: 8.3 MG/DL (ref 8.5–10.1)
CHLORIDE SERPL-SCNC: 105 MMOL/L (ref 97–108)
CO2 SERPL-SCNC: 25 MMOL/L (ref 21–32)
CREAT SERPL-MCNC: 0.96 MG/DL (ref 0.7–1.3)
DIFFERENTIAL METHOD BLD: ABNORMAL
EOSINOPHIL # BLD: 0.1 K/UL (ref 0–0.4)
EOSINOPHIL NFR BLD: 1 % (ref 0–7)
ERYTHROCYTE [DISTWIDTH] IN BLOOD BY AUTOMATED COUNT: 12.2 % (ref 11.5–14.5)
GLOBULIN SER CALC-MCNC: 3.5 G/DL (ref 2–4)
GLUCOSE SERPL-MCNC: 123 MG/DL (ref 65–100)
HCT VFR BLD AUTO: 36.4 % (ref 36.6–50.3)
HGB BLD-MCNC: 12 G/DL (ref 12.1–17)
IMM GRANULOCYTES # BLD AUTO: 0.1 K/UL (ref 0–0.04)
IMM GRANULOCYTES NFR BLD AUTO: 1 % (ref 0–0.5)
INR PPP: 1.4 (ref 0.9–1.1)
LYMPHOCYTES # BLD: 1.4 K/UL (ref 0.8–3.5)
LYMPHOCYTES NFR BLD: 8 % (ref 12–49)
MAGNESIUM SERPL-MCNC: 2.4 MG/DL (ref 1.6–2.4)
MCH RBC QN AUTO: 33.4 PG (ref 26–34)
MCHC RBC AUTO-ENTMCNC: 33 G/DL (ref 30–36.5)
MCV RBC AUTO: 101.4 FL (ref 80–99)
MONOCYTES # BLD: 1.7 K/UL (ref 0–1)
MONOCYTES NFR BLD: 11 % (ref 5–13)
NEUTS SEG # BLD: 13.1 K/UL (ref 1.8–8)
NEUTS SEG NFR BLD: 79 % (ref 32–75)
NRBC # BLD: 0 K/UL (ref 0–0.01)
NRBC BLD-RTO: 0 PER 100 WBC
PLATELET # BLD AUTO: 235 K/UL (ref 150–400)
PMV BLD AUTO: 9 FL (ref 8.9–12.9)
POTASSIUM SERPL-SCNC: 3.4 MMOL/L (ref 3.5–5.1)
PROT SERPL-MCNC: 6.4 G/DL (ref 6.4–8.2)
PROTHROMBIN TIME: 14.6 SEC (ref 9–11.1)
RBC # BLD AUTO: 3.59 M/UL (ref 4.1–5.7)
SODIUM SERPL-SCNC: 137 MMOL/L (ref 136–145)
THERAPEUTIC RANGE: ABNORMAL SECS (ref 58–77)
WBC # BLD AUTO: 16.4 K/UL (ref 4.1–11.1)

## 2024-03-13 PROCEDURE — 2060000000 HC ICU INTERMEDIATE R&B

## 2024-03-13 PROCEDURE — 97161 PT EVAL LOW COMPLEX 20 MIN: CPT

## 2024-03-13 PROCEDURE — 6360000002 HC RX W HCPCS: Performed by: FAMILY MEDICINE

## 2024-03-13 PROCEDURE — 97116 GAIT TRAINING THERAPY: CPT

## 2024-03-13 PROCEDURE — 36415 COLL VENOUS BLD VENIPUNCTURE: CPT

## 2024-03-13 PROCEDURE — 6370000000 HC RX 637 (ALT 250 FOR IP): Performed by: FAMILY MEDICINE

## 2024-03-13 PROCEDURE — 80053 COMPREHEN METABOLIC PANEL: CPT

## 2024-03-13 PROCEDURE — 94640 AIRWAY INHALATION TREATMENT: CPT

## 2024-03-13 PROCEDURE — 2500000003 HC RX 250 WO HCPCS: Performed by: FAMILY MEDICINE

## 2024-03-13 PROCEDURE — 6370000000 HC RX 637 (ALT 250 FOR IP): Performed by: HOSPITALIST

## 2024-03-13 PROCEDURE — 83735 ASSAY OF MAGNESIUM: CPT

## 2024-03-13 PROCEDURE — 85730 THROMBOPLASTIN TIME PARTIAL: CPT

## 2024-03-13 PROCEDURE — 85025 COMPLETE CBC W/AUTO DIFF WBC: CPT

## 2024-03-13 PROCEDURE — 2580000003 HC RX 258: Performed by: FAMILY MEDICINE

## 2024-03-13 PROCEDURE — 85610 PROTHROMBIN TIME: CPT

## 2024-03-13 RX ADMIN — CEFEPIME 2000 MG: 2 INJECTION, POWDER, FOR SOLUTION INTRAVENOUS at 12:08

## 2024-03-13 RX ADMIN — MONTELUKAST 10 MG: 10 TABLET, FILM COATED ORAL at 09:14

## 2024-03-13 RX ADMIN — CEFEPIME 2000 MG: 2 INJECTION, POWDER, FOR SOLUTION INTRAVENOUS at 03:32

## 2024-03-13 RX ADMIN — POTASSIUM BICARBONATE 40 MEQ: 782 TABLET, EFFERVESCENT ORAL at 14:50

## 2024-03-13 RX ADMIN — CEFEPIME 2000 MG: 2 INJECTION, POWDER, FOR SOLUTION INTRAVENOUS at 21:36

## 2024-03-13 RX ADMIN — ENOXAPARIN SODIUM 30 MG: 100 INJECTION SUBCUTANEOUS at 09:15

## 2024-03-13 RX ADMIN — ENOXAPARIN SODIUM 30 MG: 100 INJECTION SUBCUTANEOUS at 21:37

## 2024-03-13 RX ADMIN — DOXYCYCLINE 100 MG: 100 INJECTION, POWDER, LYOPHILIZED, FOR SOLUTION INTRAVENOUS at 09:13

## 2024-03-13 RX ADMIN — FLUTICASONE PROPIONATE 2 SPRAY: 50 SPRAY, METERED NASAL at 09:15

## 2024-03-13 RX ADMIN — ARFORMOTEROL TARTRATE: 15 SOLUTION RESPIRATORY (INHALATION) at 21:36

## 2024-03-13 RX ADMIN — ARFORMOTEROL TARTRATE: 15 SOLUTION RESPIRATORY (INHALATION) at 07:35

## 2024-03-13 RX ADMIN — Medication 10 ML: at 09:15

## 2024-03-13 RX ADMIN — DOXYCYCLINE 100 MG: 100 INJECTION, POWDER, LYOPHILIZED, FOR SOLUTION INTRAVENOUS at 19:22

## 2024-03-13 RX ADMIN — Medication 10 ML: at 21:37

## 2024-03-13 RX ADMIN — SODIUM CHLORIDE: 9 INJECTION, SOLUTION INTRAVENOUS at 00:39

## 2024-03-13 ASSESSMENT — PAIN SCALES - GENERAL
PAINLEVEL_OUTOF10: 0
PAINLEVEL_OUTOF10: 0

## 2024-03-13 NOTE — ED PROVIDER NOTES
[facility] EMERGENCY DEP  EMERGENCY DEPARTMENT ENCOUNTER      Pt Name: Obi Flores  MRN: [id number]  Birthdate 1955  Date of evaluation: 3/12/2024  Provider: Jeffery Chamorro DO    CHIEF COMPLAINT       Chief Complaint   Patient presents with    Abdominal Pain         HISTORY OF PRESENT ILLNESS   (Location/Symptom, Timing/Onset, Context/Setting, Quality, Duration, Modifying Factors, Severity)  Note limiting factors.   68-year-old male history of asthma and hypertension presents today with low blood pressure and abdominal pain.  Patient was seen by [facility] today for upper abdominal discomfort, mostly right-sided but states that he has cramping throughout the abdomen and his \"torso\".  He was hypotensive for them so they sent her here for further evaluation.  He denies nausea, vomiting or diarrhea.  No fevers.  Has had a slight cough.            Review of External Medical Records:     Nursing Notes were reviewed.    REVIEW OF SYSTEMS    (2-9 systems for level 4, 10 or more for level 5)     Review of Systems   Respiratory:  Positive for cough.    Gastrointestinal:  Positive for abdominal pain.   Musculoskeletal:  Positive for myalgias.       Except as noted above the remainder of the review of systems was reviewed and negative.       PAST MEDICAL HISTORY     Past Medical History:   Diagnosis Date    Asthma     HTN (hypertension)          SURGICAL HISTORY       Past Surgical History:   Procedure Laterality Date    APPENDECTOMY  1995    COLONOSCOPY  2005    COLONOSCOPY Left 4/22/2019    COLONOSCOPY performed by Shirley Curiel MD at [facility] ENDOSCOPY         CURRENT MEDICATIONS       Previous Medications    ALBUTEROL SULFATE HFA (PROVENTIL;VENTOLIN;PROAIR) 108 (90 BASE) MCG/ACT INHALER    USE 1 INHALATION BY MOUTH  EVERY 6 HOURS AS NEEDED FOR WHEEZING    DICLOFENAC (VOLTAREN) 75 MG EC TABLET    Take 1 tablet by mouth 2 times daily    ERGOCALCIFEROL (ERGOCALCIFEROL) 1.25 MG (01049 UT) CAPSULE    Take by mouth

## 2024-03-13 NOTE — H&P
dictation may have been completed with Dragon, the computer voice recognition software.  Quite often unanticipated grammatical, syntax, homophones, and other interpretive errors are inadvertently transcribed by the computer software.  Please disregard these errors.  Please excuse any errors that have escaped final proofreading.

## 2024-03-13 NOTE — ED NOTES
TRANSFER - OUT REPORT:    Verbal report given to Salma BULLOCK on Obi Flores  being transferred to [facility] for routine progression of patient care       Report consisted of patient's Situation, Background, Assessment and   Recommendations(SBAR).     Information from the following report(s) ED Encounter Summary, ED SBAR, MAR, and Recent Results was reviewed with the receiving nurse.    [place] Fall Assessment:    Presents to emergency department  because of falls (Syncope, seizure, or loss of consciousness): No  Age > 70: No  Altered Mental Status, Intoxication with alcohol or substance confusion (Disorientation, impaired judgment, poor safety awaremess, or inability to follow instructions): No  Impaired Mobility: Ambulates or transfers with assistive devices or assistance; Unable to ambulate or transer.: No  Nursing Judgement: No          Lines:   Peripheral IV 03/12/24 Right Antecubital (Active)   Site Assessment Clean, dry & intact 03/12/24 1734   Line Status Blood return noted 03/12/24 1734   Phlebitis Assessment No symptoms 03/12/24 1734   Infiltration Assessment 0 03/12/24 1734   Alcohol Cap Used No 03/12/24 1734   Dressing Status New dressing applied;Clean, dry & intact 03/12/24 1734   Dressing Type Transparent 03/12/24 1734   Dressing Intervention New 03/12/24 1734        Opportunity for questions and clarification was provided.      Patient transported with:  Monitor and Registered Nurse

## 2024-03-14 VITALS
WEIGHT: 235.23 LBS | OXYGEN SATURATION: 98 % | RESPIRATION RATE: 20 BRPM | BODY MASS INDEX: 32.81 KG/M2 | SYSTOLIC BLOOD PRESSURE: 109 MMHG | HEART RATE: 78 BPM | TEMPERATURE: 99.2 F | DIASTOLIC BLOOD PRESSURE: 73 MMHG

## 2024-03-14 PROCEDURE — 6360000002 HC RX W HCPCS: Performed by: FAMILY MEDICINE

## 2024-03-14 PROCEDURE — 94640 AIRWAY INHALATION TREATMENT: CPT

## 2024-03-14 PROCEDURE — 2580000003 HC RX 258: Performed by: FAMILY MEDICINE

## 2024-03-14 PROCEDURE — 2500000003 HC RX 250 WO HCPCS: Performed by: FAMILY MEDICINE

## 2024-03-14 PROCEDURE — 6370000000 HC RX 637 (ALT 250 FOR IP): Performed by: FAMILY MEDICINE

## 2024-03-14 RX ORDER — BENZONATATE 100 MG/1
100 CAPSULE ORAL 3 TIMES DAILY PRN
Qty: 30 CAPSULE | Refills: 0 | Status: SHIPPED | OUTPATIENT
Start: 2024-03-14 | End: 2024-03-24

## 2024-03-14 RX ORDER — LEVOFLOXACIN 500 MG/1
500 TABLET, FILM COATED ORAL DAILY
Qty: 7 TABLET | Refills: 0 | Status: SHIPPED | OUTPATIENT
Start: 2024-03-14 | End: 2024-03-21

## 2024-03-14 RX ADMIN — FLUTICASONE PROPIONATE 2 SPRAY: 50 SPRAY, METERED NASAL at 09:09

## 2024-03-14 RX ADMIN — CEFEPIME 2000 MG: 2 INJECTION, POWDER, FOR SOLUTION INTRAVENOUS at 04:21

## 2024-03-14 RX ADMIN — ENOXAPARIN SODIUM 30 MG: 100 INJECTION SUBCUTANEOUS at 09:30

## 2024-03-14 RX ADMIN — Medication 10 ML: at 09:28

## 2024-03-14 RX ADMIN — ARFORMOTEROL TARTRATE: 15 SOLUTION RESPIRATORY (INHALATION) at 07:27

## 2024-03-14 RX ADMIN — MONTELUKAST 10 MG: 10 TABLET, FILM COATED ORAL at 09:28

## 2024-03-14 RX ADMIN — DOXYCYCLINE 100 MG: 100 INJECTION, POWDER, LYOPHILIZED, FOR SOLUTION INTRAVENOUS at 08:30

## 2024-03-14 NOTE — PLAN OF CARE
Problem: Discharge Planning  Goal: Discharge to home or other facility with appropriate resources  3/14/2024 0304 by Nataliia Velasquez RN  Outcome: Progressing  Discharge to home or other facility with appropriate resources:   Identify barriers to discharge with patient and caregiver   Arrange for needed discharge resources and transportation as appropriate   Identify discharge learning needs (meds, wound care, etc)   Arrange for interpreters to assist at discharge as needed   Refer to discharge planning if patient needs post-hospital services based on physician order or complex needs related to functional status, cognitive ability or social support system     Problem: Pain  Goal: Verbalizes/displays adequate comfort level or baseline comfort level  3/14/2024 0304 by Nataliia Velasquez RN  Outcome: Progressing    Problem: Safety - Adult  Goal: Free from fall injury  3/14/2024 0304 by Nataliia Velasquez RN  Outcome: Progressing  Free From Fall Injury:   Instruct family/caregiver on patient safety   Based on caregiver fall risk screen, instruct family/caregiver to ask for assistance with transferring infant if caregiver noted to have fall risk factors     Problem: Chronic Conditions and Co-morbidities  Goal: Patient's chronic conditions and co-morbidity symptoms are monitored and maintained or improved  3/14/2024 0304 by Nataliia Velasquez RN  Outcome: Progressing    
  Problem: Discharge Planning  Goal: Discharge to home or other facility with appropriate resources  3/14/2024 1213 by Sanjuanita España, RN  Outcome: Adequate for Discharge     Problem: Pain  Goal: Verbalizes/displays adequate comfort level or baseline comfort level  3/14/2024 1213 by Sanjuanita España, RN  Outcome: Adequate for Discharge    Problem: Safety - Adult  Goal: Free from fall injury  3/14/2024 1213 by Sanjuanita España, RN  Outcome: Adequate for Discharge     Problem: Chronic Conditions and Co-morbidities  Goal: Patient's chronic conditions and co-morbidity symptoms are monitored and maintained or improved  3/14/2024 1213 by Sanjuanita España, RN  Outcome: Adequate for Discharge       
  Problem: Discharge Planning  Goal: Discharge to home or other facility with appropriate resources  Outcome: Progressing     Problem: Pain  Goal: Verbalizes/displays adequate comfort level or baseline comfort level  Outcome: Progressing     Problem: Safety - Adult  Goal: Free from fall injury  Outcome: Progressing     
  Problem: Discharge Planning  Goal: Discharge to home or other facility with appropriate resources  Outcome: Progressing  Flowsheets (Taken 3/13/2024 1615)  Discharge to home or other facility with appropriate resources:   Identify barriers to discharge with patient and caregiver   Arrange for needed discharge resources and transportation as appropriate   Identify discharge learning needs (meds, wound care, etc)   Refer to discharge planning if patient needs post-hospital services based on physician order or complex needs related to functional status, cognitive ability or social support system     Problem: Pain  Goal: Verbalizes/displays adequate comfort level or baseline comfort level  Outcome: Progressing     Problem: Safety - Adult  Goal: Free from fall injury  Outcome: Progressing     
Notify Licensed Independent Practitioner if interventions unsuccessful or patient reports new pain  3/13/2024 1643 by Linnea Natarajan RN  Outcome: Progressing     Problem: Safety - Adult  Goal: Free from fall injury  3/13/2024 1931 by Libra Ngo RN  Outcome: Progressing  3/13/2024 1643 by Linnea Natarajan RN  Outcome: Progressing  Flowsheets (Taken 3/13/2024 0800 by Libra Ngo, RN)  Free From Fall Injury:   Instruct family/caregiver on patient safety   Based on caregiver fall risk screen, instruct family/caregiver to ask for assistance with transferring infant if caregiver noted to have fall risk factors     Problem: Chronic Conditions and Co-morbidities  Goal: Patient's chronic conditions and co-morbidity symptoms are monitored and maintained or improved  Outcome: Progressing

## 2024-03-14 NOTE — PROGRESS NOTES
1600: Bedside and Verbal shift change report given to Nita Natarajan RN by Libra Ngo RN. Report included the following information Nurse Handoff Report, Index, Adult Overview, Intake/Output, MAR, Recent Results, Cardiac Rhythm NSR, Alarm Parameters, Quality Measures, and Neuro Assessment.     1615: Assessment completed. Pt repositioned in chair.     1800: Pt repositioned in chair.     1940: Bedside and Verbal shift change report given to Nataliia Velasquez RN (oncoming nurse) by Nita Natarajan RN (offgoing nurse). Report included the following information Nurse Handoff Report, Index, Adult Overview, Intake/Output, MAR, Recent Results, Cardiac Rhythm NSR, Alarm Parameters, Quality Measures, and Neuro Assessment.     
I have read and agree with Arminda Vázquez RN documentation  
I have reviewed discharge instructions with the patient and he verbalized understanding.      Bedside RN performed patient education and medication education. Discharge concerns initiated and discussed with patient, including clarification on \"who\" assists the patient at their home and instructions for when the home going patient should call their provider after discharge. Opportunity for questions and clarification was provided.       Pt's wife at bedside, discharged home.   
PHYSICAL THERAPY EVALUATION/DISCHARGE    Patient: Obi Flores (68 y.o. male)  Date: 3/13/2024  Primary Diagnosis: Hyperbilirubinemia [E80.6]  Severe sepsis (HCC) [A41.9, R65.20]  Pneumonia of right lower lobe due to infectious organism [J18.9]       Precautions:                        ASSESSMENT AND RECOMMENDATIONS:  Based on the objective data below, the patient demonstrates near baseline level of functional independence in setting of hospital admission for severe sepsis 2/2 PNA, hypotension, and hyponatremia. Patient found supine in bed and agreeable to PT. BP stable between position changes and with activity, no hypotension/ orthostatic hypotension demonstrated. SPO2 stable on RA, HR stable in 80s-90s. Patient overall mobilizing OOB and ambulating functional distances at SBA - supervision level without instability or LOB. Education provided on activity pacing if dizziness with position changes reoccurs at home for fall prevention. At this time, patient does not require further PT intervention, will sign off.      03/13/24 1323 03/13/24 1325 03/13/24 1327 03/13/24 1331   Vitals    Pulse 80 84 89 91   /64 137/86 (!) 140/72 (!) 151/71   MAP (Calculated) 85 103 95 98   Patient Position Semi fowlers Sitting Standing (pre-gait) Standing (post-gait)   SpO2  97 %  97 % 98 % 97 %   O2 Device None (Room air) None (Room air) None (Room air) None (Room air)     Functional Outcome Measure:  The patient scored 24/24 on the [facility] outcome measure with Cutoff score ?171,2,3 had higher odds of discharging home with home health or need of SNF/IPR.    Further skilled acute physical therapy is not indicated at this time.       PLAN :  Recommendation for discharge: (in order for the patient to meet his/her long term goals): No skilled physical therapy    Other factors to consider for discharge: no additional factors    IF patient discharges home will need the following DME: none       SUBJECTIVE:   Patient stated “I'm feeling 
mL IntraVENous PRN    0.9 % sodium chloride infusion   IntraVENous PRN    enoxaparin Sodium (LOVENOX) injection 30 mg  30 mg SubCUTAneous BID    ondansetron (ZOFRAN-ODT) disintegrating tablet 4 mg  4 mg Oral Q8H PRN    Or    ondansetron (ZOFRAN) injection 4 mg  4 mg IntraVENous Q6H PRN    polyethylene glycol (GLYCOLAX) packet 17 g  17 g Oral Daily PRN    0.9 % sodium chloride infusion   IntraVENous Continuous    albuterol (PROVENTIL) (2.5 MG/3ML) 0.083% nebulizer solution 2.5 mg  2.5 mg Nebulization Q6H PRN    fluticasone (FLONASE) 50 MCG/ACT nasal spray 2 spray  2 spray Nasal Daily    montelukast (SINGULAIR) tablet 10 mg  10 mg Oral Daily    tiZANidine (ZANAFLEX) tablet 2 mg  2 mg Oral BID PRN    ceFEPIme (MAXIPIME) 2,000 mg in sodium chloride 0.9 % 100 mL IVPB (mini-bag)  2,000 mg IntraVENous Q8H    doxycycline (VIBRAMYCIN) 100 mg in sodium chloride 0.9 % 100 mL IVPB (mini-bag)  100 mg IntraVENous Q12H    arformoterol 15 mcg-budesonide 0.25 mg neb solution   Nebulization BID RT     ______________________________________________________________________  EXPECTED LENGTH OF STAY: Unable to retrieve estimated LOS  ACTUAL LENGTH OF STAY:          1                 Jacobo Aguirre MD

## 2024-03-14 NOTE — DISCHARGE SUMMARY
pulses  Neuro/Psych: pleasant mood and affect, CN 2-12 grossly intact, sensory grossly within normal limit, Strength 5/5 in all extremities, DTR 1+ x 4  Skin: warm     CHRONIC MEDICAL DIAGNOSES:      Greater than 31 minutes were spent with the patient on counseling and coordination of care    Signed:   Jacobo Aguirre MD  3/14/2024  11:27 AM

## 2024-03-14 NOTE — DISCHARGE INSTRUCTIONS
Discharge Instructions       PATIENT ID: Obi Flores  MRN: [id number]   YOB: 1955    DATE OF ADMISSION: 3/12/2024   DATE OF DISCHARGE: 3/14/2024    PRIMARY CARE PROVIDER: Obi Shetty     ATTENDING PHYSICIAN: Jacobo Aguirre MD   DISCHARGING PROVIDER: Jacobo Aguirre MD    To contact this individual call [phone] and ask the  to page.   If unavailable ask to be transferred the Adult Hospitalist Department.    DISCHARGE DIAGNOSES PNA    CONSULTATIONS: @[facility]@    PROCEDURES/SURGERIES: * No surgery found *    PENDING TEST RESULTS:   At the time of discharge the following test results are still pending:     [facility] APPOINTMENTS:   [facility]     ADDITIONAL CARE RECOMMENDATIONS:     DIET: cardiac diet      ACTIVITY: activity as tolerated    WOUND CARE:     EQUIPMENT needed:       DISCHARGE MEDICATIONS:   See Medication Reconciliation Form    It is important that you take the medication exactly as they are prescribed.   Keep your medication in the bottles provided by the pharmacist and keep a list of the medication names, dosages, and times to be taken in your wallet.   Do not take other medications without consulting your doctor.       NOTIFY YOUR PHYSICIAN FOR ANY OF THE FOLLOWING:   Fever over 101 degrees for 24 hours.   Chest pain, shortness of breath, fever, chills, nausea, vomiting, diarrhea, change in mentation, falling, weakness, bleeding. Severe pain or pain not relieved by medications.  Or, any other signs or symptoms that you may have questions about.      DISPOSITION:   x Home With:   OT  PT  HH  RN       SNF/Inpatient Rehab/LTAC    Independent/assisted living    Hospice    Other:     CDMP Checked:   Yes x     PROBLEM LIST Updated:  Yes x       Signed:   Jacobo Aguirre MD  3/14/2024  11:26 AM

## 2024-03-17 LAB
BACTERIA SPEC CULT: NORMAL
BACTERIA SPEC CULT: NORMAL
SERVICE CMNT-IMP: NORMAL
SERVICE CMNT-IMP: NORMAL

## 2025-05-16 ENCOUNTER — ANESTHESIA EVENT (OUTPATIENT)
Facility: HOSPITAL | Age: 70
End: 2025-05-16
Payer: MEDICARE

## 2025-05-16 RX ORDER — FENTANYL CITRATE 50 UG/ML
25 INJECTION, SOLUTION INTRAMUSCULAR; INTRAVENOUS EVERY 5 MIN PRN
Refills: 0 | Status: CANCELLED | OUTPATIENT
Start: 2025-05-16

## 2025-05-16 RX ORDER — ACETAMINOPHEN 325 MG/1
650 TABLET ORAL
Status: CANCELLED | OUTPATIENT
Start: 2025-05-16

## 2025-05-16 RX ORDER — NALOXONE HYDROCHLORIDE 0.4 MG/ML
INJECTION, SOLUTION INTRAMUSCULAR; INTRAVENOUS; SUBCUTANEOUS PRN
Status: CANCELLED | OUTPATIENT
Start: 2025-05-16

## 2025-05-16 RX ORDER — SODIUM CHLORIDE 0.9 % (FLUSH) 0.9 %
5-40 SYRINGE (ML) INJECTION EVERY 12 HOURS SCHEDULED
Status: CANCELLED | OUTPATIENT
Start: 2025-05-16

## 2025-05-16 RX ORDER — OXYCODONE HYDROCHLORIDE 5 MG/1
5 TABLET ORAL
Refills: 0 | Status: CANCELLED | OUTPATIENT
Start: 2025-05-16

## 2025-05-16 RX ORDER — SODIUM CHLORIDE 9 MG/ML
INJECTION, SOLUTION INTRAVENOUS PRN
Status: CANCELLED | OUTPATIENT
Start: 2025-05-16

## 2025-05-16 RX ORDER — HYDROMORPHONE HYDROCHLORIDE 1 MG/ML
0.25 INJECTION, SOLUTION INTRAMUSCULAR; INTRAVENOUS; SUBCUTANEOUS EVERY 5 MIN PRN
Refills: 0 | Status: CANCELLED | OUTPATIENT
Start: 2025-05-16

## 2025-05-16 RX ORDER — PROCHLORPERAZINE EDISYLATE 5 MG/ML
5 INJECTION INTRAMUSCULAR; INTRAVENOUS
Status: CANCELLED | OUTPATIENT
Start: 2025-05-16

## 2025-05-16 RX ORDER — DEXAMETHASONE SODIUM PHOSPHATE 4 MG/ML
4 INJECTION, SOLUTION INTRA-ARTICULAR; INTRALESIONAL; INTRAMUSCULAR; INTRAVENOUS; SOFT TISSUE
Status: CANCELLED | OUTPATIENT
Start: 2025-05-16

## 2025-05-16 RX ORDER — HYDRALAZINE HYDROCHLORIDE 20 MG/ML
10 INJECTION INTRAMUSCULAR; INTRAVENOUS
Status: CANCELLED | OUTPATIENT
Start: 2025-05-16

## 2025-05-16 RX ORDER — SODIUM CHLORIDE 0.9 % (FLUSH) 0.9 %
5-40 SYRINGE (ML) INJECTION PRN
Status: CANCELLED | OUTPATIENT
Start: 2025-05-16

## 2025-05-16 NOTE — PERIOP NOTE
Still unable to reach patient, lvm on phone.  8:52- Called Dr. Ross's office and  lvm, still unable to reach patient.

## 2025-05-19 ENCOUNTER — ANESTHESIA (OUTPATIENT)
Facility: HOSPITAL | Age: 70
End: 2025-05-19
Payer: MEDICARE

## 2025-05-19 ENCOUNTER — HOSPITAL ENCOUNTER (OUTPATIENT)
Facility: HOSPITAL | Age: 70
Setting detail: OUTPATIENT SURGERY
Discharge: HOME | End: 2025-05-19
Attending: OPHTHALMOLOGY | Admitting: OPHTHALMOLOGY
Payer: MEDICARE

## 2025-05-19 VITALS
RESPIRATION RATE: 14 BRPM | HEART RATE: 56 BPM | WEIGHT: 241 LBS | BODY MASS INDEX: 32.64 KG/M2 | TEMPERATURE: 97.4 F | HEIGHT: 72 IN | SYSTOLIC BLOOD PRESSURE: 155 MMHG | DIASTOLIC BLOOD PRESSURE: 72 MMHG | OXYGEN SATURATION: 96 %

## 2025-05-19 PROCEDURE — 2580000003 HC RX 258: Performed by: OPHTHALMOLOGY

## 2025-05-19 PROCEDURE — 6360000002 HC RX W HCPCS: Performed by: NURSE ANESTHETIST, CERTIFIED REGISTERED

## 2025-05-19 PROCEDURE — 3600000003 HC SURGERY LEVEL 3 BASE: Performed by: OPHTHALMOLOGY

## 2025-05-19 PROCEDURE — 3700000001 HC ADD 15 MINUTES (ANESTHESIA): Performed by: OPHTHALMOLOGY

## 2025-05-19 PROCEDURE — 2500000003 HC RX 250 WO HCPCS: Performed by: OPHTHALMOLOGY

## 2025-05-19 PROCEDURE — 2580000003 HC RX 258: Performed by: ANESTHESIOLOGY

## 2025-05-19 PROCEDURE — 6370000000 HC RX 637 (ALT 250 FOR IP): Performed by: OPHTHALMOLOGY

## 2025-05-19 PROCEDURE — 7100000001 HC PACU RECOVERY - ADDTL 15 MIN: Performed by: OPHTHALMOLOGY

## 2025-05-19 PROCEDURE — 6360000002 HC RX W HCPCS: Performed by: OPHTHALMOLOGY

## 2025-05-19 PROCEDURE — 7100000010 HC PHASE II RECOVERY - FIRST 15 MIN: Performed by: OPHTHALMOLOGY

## 2025-05-19 PROCEDURE — 3600000013 HC SURGERY LEVEL 3 ADDTL 15MIN: Performed by: OPHTHALMOLOGY

## 2025-05-19 PROCEDURE — V2632 POST CHMBR INTRAOCULAR LENS: HCPCS | Performed by: OPHTHALMOLOGY

## 2025-05-19 PROCEDURE — 6370000000 HC RX 637 (ALT 250 FOR IP)

## 2025-05-19 PROCEDURE — 3700000000 HC ANESTHESIA ATTENDED CARE: Performed by: OPHTHALMOLOGY

## 2025-05-19 PROCEDURE — 7100000000 HC PACU RECOVERY - FIRST 15 MIN: Performed by: OPHTHALMOLOGY

## 2025-05-19 PROCEDURE — 2709999900 HC NON-CHARGEABLE SUPPLY: Performed by: OPHTHALMOLOGY

## 2025-05-19 DEVICE — STERILE UV AND BLUE LIGHT FILTERING ACRYLIC FOLDABLE ASPHERIC POSTERIOR CHAMBER INTRAOCULAR LENS
Type: IMPLANTABLE DEVICE | Site: LENS | Status: FUNCTIONAL
Brand: CLAREON

## 2025-05-19 RX ORDER — FENTANYL CITRATE 50 UG/ML
100 INJECTION, SOLUTION INTRAMUSCULAR; INTRAVENOUS
Status: DISCONTINUED | OUTPATIENT
Start: 2025-05-19 | End: 2025-05-19 | Stop reason: HOSPADM

## 2025-05-19 RX ORDER — CIPROFLOXACIN HYDROCHLORIDE 3.5 MG/ML
SOLUTION/ DROPS TOPICAL
Status: COMPLETED
Start: 2025-05-19 | End: 2025-05-19

## 2025-05-19 RX ORDER — ACETAMINOPHEN 500 MG
1000 TABLET ORAL ONCE
Status: DISCONTINUED | OUTPATIENT
Start: 2025-05-19 | End: 2025-05-19 | Stop reason: HOSPADM

## 2025-05-19 RX ORDER — SODIUM CHLORIDE 9 MG/ML
INJECTION, SOLUTION INTRAVENOUS PRN
Status: DISCONTINUED | OUTPATIENT
Start: 2025-05-19 | End: 2025-05-19 | Stop reason: HOSPADM

## 2025-05-19 RX ORDER — SODIUM CHLORIDE, POTASSIUM CHLORIDE, CALCIUM CHLORIDE, MAGNESIUM CHLORIDE, SODIUM ACETATE, AND SODIUM CITRATE 6.4; .75; .48; .3; 3.9; 1.7 MG/ML; MG/ML; MG/ML; MG/ML; MG/ML; MG/ML
15 SOLUTION IRRIGATION ONCE
Status: COMPLETED | OUTPATIENT
Start: 2025-05-19 | End: 2025-05-19

## 2025-05-19 RX ORDER — NEOMYCIN SULFATE, POLYMYXIN B SULFATE, AND DEXAMETHASONE 3.5; 10000; 1 MG/G; [USP'U]/G; MG/G
OINTMENT OPHTHALMIC ONCE
Status: COMPLETED | OUTPATIENT
Start: 2025-05-19 | End: 2025-05-19

## 2025-05-19 RX ORDER — SODIUM CHLORIDE, SODIUM LACTATE, POTASSIUM CHLORIDE, CALCIUM CHLORIDE 600; 310; 30; 20 MG/100ML; MG/100ML; MG/100ML; MG/100ML
INJECTION, SOLUTION INTRAVENOUS CONTINUOUS
Status: DISCONTINUED | OUTPATIENT
Start: 2025-05-19 | End: 2025-05-19 | Stop reason: HOSPADM

## 2025-05-19 RX ORDER — TROPICAMIDE 10 MG/ML
1 SOLUTION/ DROPS OPHTHALMIC SEE ADMIN INSTRUCTIONS
Status: COMPLETED | OUTPATIENT
Start: 2025-05-19 | End: 2025-05-19

## 2025-05-19 RX ORDER — TROPICAMIDE 10 MG/ML
SOLUTION/ DROPS OPHTHALMIC
Status: COMPLETED
Start: 2025-05-19 | End: 2025-05-19

## 2025-05-19 RX ORDER — POVIDONE-IODINE 5 %
SOLUTION, NON-ORAL OPHTHALMIC (EYE) ONCE
Status: COMPLETED | OUTPATIENT
Start: 2025-05-19 | End: 2025-05-19

## 2025-05-19 RX ORDER — TIMOLOL MALEATE 5 MG/ML
1 SOLUTION/ DROPS OPHTHALMIC ONCE
Status: COMPLETED | OUTPATIENT
Start: 2025-05-19 | End: 2025-05-19

## 2025-05-19 RX ORDER — ONDANSETRON 2 MG/ML
4 INJECTION INTRAMUSCULAR; INTRAVENOUS ONCE
Status: DISCONTINUED | OUTPATIENT
Start: 2025-05-19 | End: 2025-05-19 | Stop reason: HOSPADM

## 2025-05-19 RX ORDER — CIPROFLOXACIN HYDROCHLORIDE 3.5 MG/ML
1 SOLUTION/ DROPS TOPICAL SEE ADMIN INSTRUCTIONS
Status: COMPLETED | OUTPATIENT
Start: 2025-05-19 | End: 2025-05-19

## 2025-05-19 RX ORDER — SODIUM CHLORIDE 0.9 % (FLUSH) 0.9 %
5-40 SYRINGE (ML) INJECTION EVERY 12 HOURS SCHEDULED
Status: DISCONTINUED | OUTPATIENT
Start: 2025-05-19 | End: 2025-05-19 | Stop reason: HOSPADM

## 2025-05-19 RX ORDER — SODIUM CHLORIDE 0.9 % (FLUSH) 0.9 %
5-40 SYRINGE (ML) INJECTION PRN
Status: DISCONTINUED | OUTPATIENT
Start: 2025-05-19 | End: 2025-05-19 | Stop reason: HOSPADM

## 2025-05-19 RX ORDER — MIDAZOLAM HYDROCHLORIDE 1 MG/ML
INJECTION, SOLUTION INTRAMUSCULAR; INTRAVENOUS
Status: DISCONTINUED | OUTPATIENT
Start: 2025-05-19 | End: 2025-05-19 | Stop reason: SDUPTHER

## 2025-05-19 RX ORDER — MIDAZOLAM HYDROCHLORIDE 2 MG/2ML
2 INJECTION, SOLUTION INTRAMUSCULAR; INTRAVENOUS
Status: DISCONTINUED | OUTPATIENT
Start: 2025-05-19 | End: 2025-05-19 | Stop reason: HOSPADM

## 2025-05-19 RX ORDER — TETRACAINE HYDROCHLORIDE 5 MG/ML
1 SOLUTION OPHTHALMIC ONCE
Status: COMPLETED | OUTPATIENT
Start: 2025-05-19 | End: 2025-05-19

## 2025-05-19 RX ADMIN — SODIUM CHLORIDE: 0.9 INJECTION, SOLUTION INTRAVENOUS at 11:19

## 2025-05-19 RX ADMIN — TROPICAMIDE 1 DROP: 10 SOLUTION/ DROPS OPHTHALMIC at 11:20

## 2025-05-19 RX ADMIN — MIDAZOLAM HYDROCHLORIDE 1 MG: 1 INJECTION, SOLUTION INTRAMUSCULAR; INTRAVENOUS at 12:57

## 2025-05-19 RX ADMIN — CIPROFLOXACIN 1 DROP: 3 SOLUTION OPHTHALMIC at 11:20

## 2025-05-19 RX ADMIN — TROPICAMIDE 1 DROP: 10 SOLUTION/ DROPS OPHTHALMIC at 11:07

## 2025-05-19 RX ADMIN — CIPROFLOXACIN 1 DROP: 3 SOLUTION OPHTHALMIC at 11:07

## 2025-05-19 RX ADMIN — TROPICAMIDE 1 DROP: 10 SOLUTION/ DROPS OPHTHALMIC at 11:17

## 2025-05-19 RX ADMIN — CIPROFLOXACIN 1 DROP: 3 SOLUTION OPHTHALMIC at 11:17

## 2025-05-19 RX ADMIN — MIDAZOLAM HYDROCHLORIDE 1 MG: 1 INJECTION, SOLUTION INTRAMUSCULAR; INTRAVENOUS at 12:51

## 2025-05-19 ASSESSMENT — PAIN - FUNCTIONAL ASSESSMENT: PAIN_FUNCTIONAL_ASSESSMENT: 0-10

## 2025-05-19 NOTE — ANESTHESIA POSTPROCEDURE EVALUATION
Department of Anesthesiology  Postprocedure Note    Patient: Obi Flores  MRN: [id number]  YOB: 1955  Date of evaluation: 5/19/2025    Procedure Summary       Date: 05/19/25 Room / Location: [facility] ASU B3 / [facility] AMBULATORY OR    Anesthesia Start: 1248 Anesthesia Stop: 1326    Procedure: RIGHT EYE CATARACT EXTRACTION WITH INTRAOCULAR LENS (Right: Eye) Diagnosis:       Age-related nuclear cataract of right eye      (Age-related nuclear cataract of right eye [H25.11])    Surgeons: Blake Mata MD Responsible Provider: Amira Mcclure MD    Anesthesia Type: MAC ASA Status: 2            Anesthesia Type: MAC    Christian Phase I: Christian Score: 10    Christian Phase II: Christian Score: 10    Anesthesia Post Evaluation    Patient location during evaluation: PACU  Patient participation: complete - patient participated  Level of consciousness: awake and alert  Pain score: 0  Airway patency: patent  Nausea & Vomiting: no vomiting and no nausea  Cardiovascular status: blood pressure returned to baseline and hemodynamically stable  Respiratory status: acceptable  Hydration status: stable  There was medical reason for not using a multimodal analgesia pain management approach.Pain management: satisfactory to patient    No notable events documented.

## 2025-05-19 NOTE — PERIOP NOTE
Pacu Recovery    Patient returned to baseline, vital signs stable (see vital sign flowsheet). Denies pain. Patient offered liquids and tolerated well. Respiratory status within defined limits. Dressing intact and in place. Responsible party driving patient home was given the opportunity to ask questions. Patient discharged with documented belongings.

## 2025-05-19 NOTE — ANESTHESIA PRE PROCEDURE
Department of Anesthesiology  Preprocedure Note       Name:  Obi Flores   Age:  70 y.o.  :  1955                                          MRN:  [id number]         Date:  2025      Surgeon: Surgeon(s):  Blake Mata MD    Procedure: Procedure(s):  RIGHT EYE CATARACT EXTRACTION WITH INTRAOCULAR LENS    Medications prior to admission:   Prior to Admission medications    Medication Sig Start Date End Date Taking? Authorizing Provider   albuterol sulfate HFA (PROVENTIL;VENTOLIN;PROAIR) 108 (90 Base) MCG/ACT inhaler USE 1 INHALATION BY MOUTH  EVERY 6 HOURS AS NEEDED FOR WHEEZING 20  Yes Automatic Reconciliation, Ar   diclofenac (VOLTAREN) 75 MG EC tablet Take 1 tablet by mouth 2 times daily 22  Yes Automatic Reconciliation, Ar   ergocalciferol (ERGOCALCIFEROL) 1.25 MG (10745 UT) capsule Take by mouth   Yes Automatic Reconciliation, Ar   fluticasone (FLONASE) 50 MCG/ACT nasal spray 2 sprays by Nasal route daily 20  Yes Automatic Reconciliation, Ar   hydroCHLOROthiazide (MICROZIDE) 12.5 MG capsule Take 1 capsule by mouth daily 20  Yes Automatic Reconciliation, Ar   montelukast (SINGULAIR) 10 MG tablet Take 1 tablet by mouth daily 20  Yes Automatic Reconciliation, Ar   tiZANidine (ZANAFLEX) 2 MG tablet Take by mouth 2 times daily as needed 21  Yes Automatic Reconciliation, Ar       Current medications:    Current Facility-Administered Medications   Medication Dose Route Frequency Provider Last Rate Last Admin   • acetaminophen (TYLENOL) tablet 1,000 mg  1,000 mg Oral Once Dimitrios Manley MD       • fentaNYL (SUBLIMAZE) injection 100 mcg  100 mcg IntraVENous Once PRN Dimitrios Manley MD       • ondansetron (ZOFRAN) injection 4 mg  4 mg IntraVENous Once Dimitrios Manley MD       • lactated ringers infusion   IntraVENous Continuous Dimitrios Manley MD       • sodium chloride flush 0.9 % injection 5-40 mL  5-40 mL IntraVENous 2 times per day Dimitrios Manley MD       •

## 2025-05-19 NOTE — DISCHARGE INSTRUCTIONS
ABY WEEKS MD  [address] [address] [address]  [place] [address]  Phone: [phone]  If you are unable to keep appointment, kindly give 24 hours notice please.    REMOVE PATCH  START DROPS WHEN YOU GET HOME  PUT PATCH BACK ON AT BEDTIME    DO NOT RUB the eye that was operated on.  Do not strain excessively. It is all right to bend as long as you do not strain.  It is safe to take a shower, wash your face, and wash your hair. Just keep the eye closed.  Do not swim for 1 week after surgery.  If you have any problems or questions, do not hesitate to call [phone].  Follow instructions on eye drops from office.  You may take Tylenol or Advil for discomfort.  If it pressure not relieved by Tylenol or Advil, please call Dr. Weeks's office.        TO PREVENT AN INFECTION      WASH YOUR HANDS    To prevent infection, good handwashing is the most important thing you or your caregiver can do.      Wash your hands with soap and water or use the hand  we gave you before you touch any wounds.       USE CLEAN SHEETS    Use freshly cleaned sheets on your bed after surgery.     To keep the surgery site clean, do not allow pets to sleep with you while your wound is still healing.     STOP SMOKING    Stop smoking, or at least cut back on smoking    Smoking slows your healing.          CONTROL YOUR BLOOD SUGAR    High blood sugars slow wound healing.    If you are diabetic, control your blood sugar levels before and after your surgery.         If you were given prescriptions, please review the written information on the prescribed medications.     DO NOT DRIVE WHILE TAKING NARCOTIC PAIN MEDICATIONS.    DISCHARGE SUMMARY from Nurse    The following personal items collected during your admission are returned to you:   Dental Appliance:    Vision:    Hearing Aid:    Jewelry:    Clothing:    Other Valuables:    Valuables sent to safe:      PATIENT INSTRUCTIONS:    After general anesthesia or intravenous

## 2025-05-19 NOTE — BRIEF OP NOTE
Brief Postoperative Note      Patient: Obi Flores  YOB: 1955  MRN: [id number]    Date of Procedure: 5/19/2025    Pre-Op Diagnosis Codes:      * Age-related nuclear cataract of right eye [H25.11]    Post-Op Diagnosis: Nuclear Sclerotic cataract right eye H25.11       Procedure(s):  RIGHT EYE CATARACT EXTRACTION WITH INTRAOCULAR LENS    Surgeon(s):  Blake Weeks MD    Assistant:  * No surgical staff found *    Anesthesia: Monitor Anesthesia Care    Estimated Blood Loss (mL): Minimal    Complications: None    Specimens:   * No specimens in log *    Implants:  Implant Name Type Inv. Item Serial No.  Lot No. LRB No. Used Action   LENS CLAREON IOL 21.0D - [id number]  LENS CLAREON IOL 21.0D [id number] [employer]  Right 1 Implanted         Drains: * No LDAs found *    Findings:  Infection Present At Time Of Surgery (PATOS) (choose all levels that have infection present):  No infection present  Other Findings: Visually significant cataract right eye    Electronically signed by BLAKE WEEKS MD on 5/19/2025 at 1:23 PM

## 2025-05-19 NOTE — PROGRESS NOTES
Permission received to review discharge instructions and private health information with wife, Shaylee and will have family/friends home with them for 24 hours.

## 2025-05-19 NOTE — OP NOTE
Operative Note      Patient: Obi Flores  YOB: 1955  MRN: [id number]    Date of Procedure: 5/19/2025    Pre-Op Diagnosis Codes:      * Age-related nuclear cataract of right eye [H25.11]    Post-Op Diagnosis: Nuclear Sclerotic cataract right eye H25.11       Procedure(s):  RIGHT EYE CATARACT EXTRACTION WITH INTRAOCULAR LENS    Surgeon(s):  Blake Mata MD    Assistant:   * No surgical staff found *    Anesthesia: Monitor Anesthesia Care    Estimated Blood Loss (mL): Minimal    Complications: None    Specimens:   * No specimens in log *    Implants:  Implant Name Type Inv. Item Serial No.  Lot No. LRB No. Used Action   LENS CLAREON IOL 21.0D - [id number]  LENS CLAREON IOL 21.0D [id number] [employer]  Right 1 Implanted         Drains: * No LDAs found *    Findings:  Infection Present At Time Of Surgery (PATOS) (choose all levels that have infection present):  No infection present  Other Findings: Visually significant cataract right eye    Detailed Description of Procedure:     Preoperative Diagnosis: Nuclear Sclerotic cataract right eye H25.11  Postoperative Diagnosis: Nuclear Sclerotic cataract right eye H25.11  Procedure: Extracapsular cataract extraction with lens implant right eye  Surgeon:  SOUTH Mata MD  Assistants: None  Anesthesia: MAC with local  Estimated Blood Loss: None  Findings: Cataract right eye  Complications: None  Specimens: None  Prosthetic Devices: Intraocular lens implant     The patient's right eye was dilated with mydriacyl 1% and ciprofloxacin 0.3% for 3 doses preoperatively.  The patient was taken to the operating room and was given sedation.  Tetracaine was given topically to the right eye, and the eye was prepped and draped in the usual manner for sterile eye surgery, including Betadine solution being dropped onto the conjunctiva at the beginning of the prep.  The eyelashes were isolated with a plastic drape.  A lid speculum was

## 2025-06-06 ENCOUNTER — ANESTHESIA EVENT (OUTPATIENT)
Facility: HOSPITAL | Age: 70
End: 2025-06-06
Payer: MEDICARE

## 2025-06-09 ENCOUNTER — ANESTHESIA (OUTPATIENT)
Facility: HOSPITAL | Age: 70
End: 2025-06-09
Payer: MEDICARE

## 2025-06-09 ENCOUNTER — HOSPITAL ENCOUNTER (OUTPATIENT)
Facility: HOSPITAL | Age: 70
Setting detail: OUTPATIENT SURGERY
Discharge: HOME | End: 2025-06-09
Attending: OPHTHALMOLOGY | Admitting: OPHTHALMOLOGY
Payer: MEDICARE

## 2025-06-09 VITALS
WEIGHT: 241 LBS | HEIGHT: 71 IN | OXYGEN SATURATION: 96 % | SYSTOLIC BLOOD PRESSURE: 159 MMHG | TEMPERATURE: 97.6 F | DIASTOLIC BLOOD PRESSURE: 73 MMHG | RESPIRATION RATE: 13 BRPM | HEART RATE: 56 BPM | BODY MASS INDEX: 33.74 KG/M2

## 2025-06-09 PROCEDURE — 2500000003 HC RX 250 WO HCPCS: Performed by: OPHTHALMOLOGY

## 2025-06-09 PROCEDURE — 3600000013 HC SURGERY LEVEL 3 ADDTL 15MIN: Performed by: OPHTHALMOLOGY

## 2025-06-09 PROCEDURE — 7100000000 HC PACU RECOVERY - FIRST 15 MIN: Performed by: OPHTHALMOLOGY

## 2025-06-09 PROCEDURE — 7100000011 HC PHASE II RECOVERY - ADDTL 15 MIN: Performed by: OPHTHALMOLOGY

## 2025-06-09 PROCEDURE — 2580000003 HC RX 258: Performed by: ANESTHESIOLOGY

## 2025-06-09 PROCEDURE — 6360000002 HC RX W HCPCS: Performed by: OPHTHALMOLOGY

## 2025-06-09 PROCEDURE — 2709999900 HC NON-CHARGEABLE SUPPLY: Performed by: OPHTHALMOLOGY

## 2025-06-09 PROCEDURE — 7100000010 HC PHASE II RECOVERY - FIRST 15 MIN: Performed by: OPHTHALMOLOGY

## 2025-06-09 PROCEDURE — 6370000000 HC RX 637 (ALT 250 FOR IP)

## 2025-06-09 PROCEDURE — 6360000002 HC RX W HCPCS: Performed by: REGISTERED NURSE

## 2025-06-09 PROCEDURE — 3600000003 HC SURGERY LEVEL 3 BASE: Performed by: OPHTHALMOLOGY

## 2025-06-09 PROCEDURE — 3700000001 HC ADD 15 MINUTES (ANESTHESIA): Performed by: OPHTHALMOLOGY

## 2025-06-09 PROCEDURE — V2632 POST CHMBR INTRAOCULAR LENS: HCPCS | Performed by: OPHTHALMOLOGY

## 2025-06-09 PROCEDURE — 3700000000 HC ANESTHESIA ATTENDED CARE: Performed by: OPHTHALMOLOGY

## 2025-06-09 PROCEDURE — 6370000000 HC RX 637 (ALT 250 FOR IP): Performed by: OPHTHALMOLOGY

## 2025-06-09 DEVICE — STERILE UV AND BLUE LIGHT FILTERING ACRYLIC FOLDABLE ASPHERIC POSTERIOR CHAMBER INTRAOCULAR LENS
Type: IMPLANTABLE DEVICE | Site: LENS | Status: FUNCTIONAL
Brand: CLAREON

## 2025-06-09 RX ORDER — TROPICAMIDE 10 MG/ML
1 SOLUTION/ DROPS OPHTHALMIC SEE ADMIN INSTRUCTIONS
Status: COMPLETED | OUTPATIENT
Start: 2025-06-09 | End: 2025-06-09

## 2025-06-09 RX ORDER — KETOROLAC TROMETHAMINE 30 MG/ML
15 INJECTION, SOLUTION INTRAMUSCULAR; INTRAVENOUS
Status: DISCONTINUED | OUTPATIENT
Start: 2025-06-09 | End: 2025-06-09 | Stop reason: HOSPADM

## 2025-06-09 RX ORDER — CIPROFLOXACIN HYDROCHLORIDE 3.5 MG/ML
SOLUTION/ DROPS TOPICAL
Status: COMPLETED
Start: 2025-06-09 | End: 2025-06-09

## 2025-06-09 RX ORDER — LIDOCAINE HYDROCHLORIDE 10 MG/ML
1 INJECTION, SOLUTION EPIDURAL; INFILTRATION; INTRACAUDAL; PERINEURAL
Status: DISCONTINUED | OUTPATIENT
Start: 2025-06-09 | End: 2025-06-09 | Stop reason: HOSPADM

## 2025-06-09 RX ORDER — TROPICAMIDE 10 MG/ML
SOLUTION/ DROPS OPHTHALMIC
Status: COMPLETED
Start: 2025-06-09 | End: 2025-06-09

## 2025-06-09 RX ORDER — MIDAZOLAM HYDROCHLORIDE 1 MG/ML
INJECTION, SOLUTION INTRAMUSCULAR; INTRAVENOUS
Status: DISCONTINUED | OUTPATIENT
Start: 2025-06-09 | End: 2025-06-09 | Stop reason: SDUPTHER

## 2025-06-09 RX ORDER — CIPROFLOXACIN HYDROCHLORIDE 3.5 MG/ML
1 SOLUTION/ DROPS TOPICAL SEE ADMIN INSTRUCTIONS
Status: COMPLETED | OUTPATIENT
Start: 2025-06-09 | End: 2025-06-09

## 2025-06-09 RX ORDER — SODIUM CHLORIDE 9 MG/ML
INJECTION, SOLUTION INTRAVENOUS PRN
Status: DISCONTINUED | OUTPATIENT
Start: 2025-06-09 | End: 2025-06-09 | Stop reason: HOSPADM

## 2025-06-09 RX ORDER — NALOXONE HYDROCHLORIDE 0.4 MG/ML
INJECTION, SOLUTION INTRAMUSCULAR; INTRAVENOUS; SUBCUTANEOUS PRN
Status: DISCONTINUED | OUTPATIENT
Start: 2025-06-09 | End: 2025-06-09 | Stop reason: HOSPADM

## 2025-06-09 RX ORDER — TIMOLOL MALEATE 5 MG/ML
1 SOLUTION/ DROPS OPHTHALMIC ONCE
Status: COMPLETED | OUTPATIENT
Start: 2025-06-09 | End: 2025-06-09

## 2025-06-09 RX ORDER — SODIUM CHLORIDE 0.9 % (FLUSH) 0.9 %
5-40 SYRINGE (ML) INJECTION EVERY 12 HOURS SCHEDULED
Status: DISCONTINUED | OUTPATIENT
Start: 2025-06-09 | End: 2025-06-09 | Stop reason: HOSPADM

## 2025-06-09 RX ORDER — SODIUM CHLORIDE 0.9 % (FLUSH) 0.9 %
5-40 SYRINGE (ML) INJECTION PRN
Status: DISCONTINUED | OUTPATIENT
Start: 2025-06-09 | End: 2025-06-09 | Stop reason: HOSPADM

## 2025-06-09 RX ORDER — ACETAMINOPHEN 500 MG
1000 TABLET ORAL
Status: DISCONTINUED | OUTPATIENT
Start: 2025-06-09 | End: 2025-06-09 | Stop reason: HOSPADM

## 2025-06-09 RX ORDER — ONDANSETRON 2 MG/ML
4 INJECTION INTRAMUSCULAR; INTRAVENOUS
Status: DISCONTINUED | OUTPATIENT
Start: 2025-06-09 | End: 2025-06-09 | Stop reason: HOSPADM

## 2025-06-09 RX ORDER — SODIUM CHLORIDE, SODIUM LACTATE, POTASSIUM CHLORIDE, CALCIUM CHLORIDE 600; 310; 30; 20 MG/100ML; MG/100ML; MG/100ML; MG/100ML
INJECTION, SOLUTION INTRAVENOUS CONTINUOUS
Status: DISCONTINUED | OUTPATIENT
Start: 2025-06-09 | End: 2025-06-09 | Stop reason: HOSPADM

## 2025-06-09 RX ORDER — TETRACAINE HYDROCHLORIDE 5 MG/ML
1 SOLUTION OPHTHALMIC ONCE
Status: COMPLETED | OUTPATIENT
Start: 2025-06-09 | End: 2025-06-09

## 2025-06-09 RX ORDER — FENTANYL CITRATE 50 UG/ML
25 INJECTION, SOLUTION INTRAMUSCULAR; INTRAVENOUS EVERY 5 MIN PRN
Status: DISCONTINUED | OUTPATIENT
Start: 2025-06-09 | End: 2025-06-09 | Stop reason: HOSPADM

## 2025-06-09 RX ORDER — NEOMYCIN SULFATE, POLYMYXIN B SULFATE, AND DEXAMETHASONE 3.5; 10000; 1 MG/G; [USP'U]/G; MG/G
OINTMENT OPHTHALMIC ONCE
Status: COMPLETED | OUTPATIENT
Start: 2025-06-09 | End: 2025-06-09

## 2025-06-09 RX ORDER — SODIUM CHLORIDE, POTASSIUM CHLORIDE, CALCIUM CHLORIDE, MAGNESIUM CHLORIDE, SODIUM ACETATE, AND SODIUM CITRATE 6.4; .75; .48; .3; 3.9; 1.7 MG/ML; MG/ML; MG/ML; MG/ML; MG/ML; MG/ML
SOLUTION IRRIGATION PRN
Status: DISCONTINUED | OUTPATIENT
Start: 2025-06-09 | End: 2025-06-09 | Stop reason: ALTCHOICE

## 2025-06-09 RX ORDER — DROPERIDOL 2.5 MG/ML
0.62 INJECTION, SOLUTION INTRAMUSCULAR; INTRAVENOUS
Status: DISCONTINUED | OUTPATIENT
Start: 2025-06-09 | End: 2025-06-09 | Stop reason: HOSPADM

## 2025-06-09 RX ORDER — POVIDONE-IODINE 5 %
SOLUTION, NON-ORAL OPHTHALMIC (EYE) ONCE
Status: COMPLETED | OUTPATIENT
Start: 2025-06-09 | End: 2025-06-09

## 2025-06-09 RX ADMIN — CIPROFLOXACIN 1 DROP: 3 SOLUTION OPHTHALMIC at 08:48

## 2025-06-09 RX ADMIN — CIPROFLOXACIN 1 DROP: 3 SOLUTION OPHTHALMIC at 08:42

## 2025-06-09 RX ADMIN — MIDAZOLAM HYDROCHLORIDE 1 MG: 1 INJECTION, SOLUTION INTRAMUSCULAR; INTRAVENOUS at 09:32

## 2025-06-09 RX ADMIN — TROPICAMIDE 1 DROP: 10 SOLUTION/ DROPS OPHTHALMIC at 08:42

## 2025-06-09 RX ADMIN — TROPICAMIDE 1 DROP: 10 SOLUTION/ DROPS OPHTHALMIC at 08:48

## 2025-06-09 RX ADMIN — SODIUM CHLORIDE: 0.9 INJECTION, SOLUTION INTRAVENOUS at 08:41

## 2025-06-09 RX ADMIN — MIDAZOLAM HYDROCHLORIDE 1 MG: 1 INJECTION, SOLUTION INTRAMUSCULAR; INTRAVENOUS at 09:36

## 2025-06-09 RX ADMIN — TROPICAMIDE 1 DROP: 10 SOLUTION/ DROPS OPHTHALMIC at 08:45

## 2025-06-09 RX ADMIN — CIPROFLOXACIN 1 DROP: 3 SOLUTION OPHTHALMIC at 08:45

## 2025-06-09 ASSESSMENT — PAIN - FUNCTIONAL ASSESSMENT: PAIN_FUNCTIONAL_ASSESSMENT: 0-10

## 2025-06-09 NOTE — OP NOTE
Operative Note      Patient: Obi Flores  YOB: 1955  MRN: [id number]    Date of Procedure: 6/9/2025    Pre-Op Diagnosis Codes:      * Age-related nuclear cataract of left eye [H25.12]    Post-Op Diagnosis: Nuclear Sclerotic cataract left eye H25.12       Procedure(s):  LEFT EYE CATARACT EXTRACTION WITH INTRAOCULAR LENS    Surgeon(s):  Blake Mata MD    Assistant:   * No surgical staff found *    Anesthesia: Monitor Anesthesia Care    Estimated Blood Loss (mL): Minimal    Complications: None    Specimens:   * No specimens in log *    Implants:  Implant Name Type Inv. Item Serial No.  Lot No. LRB No. Used Action   LENS CLAREON IOL 20.5D - [id number]  LENS CLAREON IOL 20.5D [id number] [employer] N/A Left 1 Implanted         Drains: * No LDAs found *    Findings:  Infection Present At Time Of Surgery (PATOS) (choose all levels that have infection present):  No infection present  Other Findings: Visually significant cataract left eye    Detailed Description of Procedure:     Preoperative Diagnosis: Nuclear Sclerotic cataract left eye H25.12  Postoperative Diagnosis: Nuclear Sclerotic cataract left eye H25.12  Procedure: Extracapsular cataract extraction with lens implant left eye  Surgeon:  SOUTH Mata MD  Assistants: None  Anesthesia: MAC with local  Estimated Blood Loss: None  Findings: Cataract left eye  Complications: None  Specimens: None  Prosthetic Devices: Intraocular lens implant     The patient's left eye was dilated with mydriacyl 1% and ciprofloxacin 0.3% for 3 doses preoperatively.  The patient was taken to the operating room and was given sedation.  Tetracaine was given topically to the left eye, and the eye was prepped and draped in the usual manner for sterile eye surgery, including Betadine solution being dropped onto the conjunctiva at the beginning of the prep.  The eyelashes were isolated with a plastic drape.  A lid speculum was placed.

## 2025-06-09 NOTE — DISCHARGE INSTRUCTIONS
ABY WEEKS MD  [address] [address] [address]  [place] [address]  Phone: [phone]  If you are unable to keep appointment, kindly give 24 hours notice please.    REMOVE PATCH  START DROPS WHEN YOU GET HOME  PUT PATCH BACK ON AT BEDTIME    DO NOT RUB the eye that was operated on.  Do not strain excessively. It is all right to bend as long as you do not strain.  It is safe to take a shower, wash your face, and wash your hair. Just keep the eye closed.  Do not swim for 1 week after surgery.  If you have any problems or questions, do not hesitate to call [phone].  Follow instructions on eye drops from office.  You may take Tylenol or Advil for discomfort.  If it pressure not relieved by Tylenol or Advil, please call Dr. Weeks's office.        TO PREVENT AN INFECTION      WASH YOUR HANDS    To prevent infection, good handwashing is the most important thing you or your caregiver can do.      Wash your hands with soap and water or use the hand  we gave you before you touch any wounds.       USE CLEAN SHEETS    Use freshly cleaned sheets on your bed after surgery.     To keep the surgery site clean, do not allow pets to sleep with you while your wound is still healing.     STOP SMOKING    Stop smoking, or at least cut back on smoking    Smoking slows your healing.          CONTROL YOUR BLOOD SUGAR    High blood sugars slow wound healing.    If you are diabetic, control your blood sugar levels before and after your surgery.         If you were given prescriptions, please review the written information on the prescribed medications.     DO NOT DRIVE WHILE TAKING NARCOTIC PAIN MEDICATIONS.    DISCHARGE SUMMARY from Nurse    The following personal items collected during your admission are returned to you:   Dental Appliance:    Vision:    Hearing Aid:    Jewelry:    Clothing:    Other Valuables:    Valuables sent to safe:      PATIENT INSTRUCTIONS:    After general anesthesia or intravenous

## 2025-06-09 NOTE — ANESTHESIA PRE PROCEDURE
03/13/2024 12:47 AM    APTT 25.4 02/05/2022 02:55 PM       HCG (If Applicable): No results found for: \"PREGTESTUR\", \"PREGSERUM\", \"HCG\", \"HCGQUANT\"     ABGs: No results found for: \"PHART\", \"PO2ART\", \"LSE3MIV\", \"ZOL3QAX\", \"BEART\", \"D4DTFVRH\"     Type & Screen (If Applicable):  Lab Results   Component Value Date    ABORH A POSITIVE 02/05/2022    LABANTI NEG 02/05/2022       Drug/Infectious Status (If Applicable):  Lab Results   Component Value Date/Time    HEPCAB 9.4 04/21/2020 11:07 AM       COVID-19 Screening (If Applicable):   Lab Results   Component Value Date/Time    COVID19 Not detected 03/12/2024 07:17 PM           Anesthesia Evaluation  Patient summary reviewed and Nursing notes reviewed  Airway: Mallampati: III  TM distance: >3 FB   Neck ROM: full  Mouth opening: > = 3 FB   Dental: normal exam         Pulmonary: breath sounds clear to auscultation  (+)           asthma (uses inhaler once daily):     (-) pneumonia                          ROS comment: Former smoker  uses inhaler once/ day   Cardiovascular:  Exercise tolerance: good (>4 METS)  (+) hypertension:, hyperlipidemia        Rhythm: regular  Rate: normal                    Neuro/Psych:   Negative Neuro/Psych ROS              GI/Hepatic/Renal: Neg GI/Hepatic/Renal ROS            Endo/Other:                      ROS comment: cataracts Abdominal:             Vascular: negative vascular ROS.         Other Findings:       Anesthesia Plan      MAC     ASA 2       Induction: intravenous.      Anesthetic plan and risks discussed with patient.      Plan discussed with CRNA.    Attending anesthesiologist reviewed and agrees with Preprocedure content            Amira Mcclure MD   6/9/2025

## 2025-06-09 NOTE — ANESTHESIA POSTPROCEDURE EVALUATION
Department of Anesthesiology  Postprocedure Note    Patient: Obi Flores  MRN: [id number]  YOB: 1955  Date of evaluation: 6/9/2025    Procedure Summary       Date: 06/09/25 Room / Location: [facility] ASU B3 / [facility] AMBULATORY OR    Anesthesia Start: 0929 Anesthesia Stop: 0959    Procedure: LEFT EYE CATARACT EXTRACTION WITH INTRAOCULAR LENS (Left: Eye) Diagnosis:       Age-related nuclear cataract of left eye      (Age-related nuclear cataract of left eye [H25.12])    Surgeons: Blake Mata MD Responsible Provider: Amira Mcclure MD    Anesthesia Type: MAC ASA Status: 2            Anesthesia Type: MAC    Christian Phase I: Christian Score: 10    Christian Phase II: Christian Score: 10    Anesthesia Post Evaluation    Patient location during evaluation: PACU  Patient participation: complete - patient participated  Level of consciousness: awake and alert  Pain score: 0  Airway patency: patent  Nausea & Vomiting: no vomiting and no nausea  Cardiovascular status: blood pressure returned to baseline and hemodynamically stable  Respiratory status: acceptable  Hydration status: stable  There was medical reason for not using a multimodal analgesia pain management approach.Pain management: satisfactory to patient    No notable events documented.

## 2025-06-09 NOTE — BRIEF OP NOTE
Brief Postoperative Note      Patient: Obi Flores  YOB: 1955  MRN: [id number]    Date of Procedure: 6/9/2025    Pre-Op Diagnosis Codes:      * Age-related nuclear cataract of left eye [H25.12]    Post-Op Diagnosis: Nuclear Sclerotic cataract left eye H25.12       Procedure(s):  LEFT EYE CATARACT EXTRACTION WITH INTRAOCULAR LENS    Surgeon(s):  Blake Weeks MD    Assistant:  * No surgical staff found *    Anesthesia: Monitor Anesthesia Care    Estimated Blood Loss (mL): Minimal    Complications: None    Specimens:   * No specimens in log *    Implants:  Implant Name Type Inv. Item Serial No.  Lot No. LRB No. Used Action   LENS CLAREON IOL 20.5D - [id number]  LENS CLAREON IOL 20.5D [id number] [employer]- N/A Left 1 Implanted         Drains: * No LDAs found *    Findings:  Infection Present At Time Of Surgery (PATOS) (choose all levels that have infection present):  No infection present  Other Findings: Visually significant cataract left eye    Electronically signed by BLAKE WEEKS MD on 6/9/2025 at 10:02 AM

## 2025-06-09 NOTE — H&P
HISTORY AND PHYSICAL             Date: 6/9/2025        Patient Name: Obi Flores     YOB: 1955      Age:  70 y.o.    Chief Complaint   Blurred vision left eye for cataract extraction, lens implant left eye         History Obtained From   patient    History of Present Illness   Visually significant cataract left eye for cataract extraction, lens implant left eye.    Past Medical History     Past Medical History:   Diagnosis Date    Asthma     At risk for sleep apnea     as of 6/4/2025 TORITO Score is 4    HTN (hypertension)     Hyperlipidemia         Past Surgical History     Past Surgical History:   Procedure Laterality Date    APPENDECTOMY  1995    COLONOSCOPY  2005    COLONOSCOPY Left 4/22/2019    COLONOSCOPY performed by Shirley Curiel MD at [facility] ENDOSCOPY    EYE SURGERY Right 5/19/2025    RIGHT EYE CATARACT EXTRACTION WITH INTRAOCULAR LENS performed by Blake Mata MD at [facility] AMBULATORY OR        Medications Prior to Admission     Prior to Admission medications    Medication Sig Start Date End Date Taking? Authorizing Provider   albuterol sulfate HFA (PROVENTIL;VENTOLIN;PROAIR) 108 (90 Base) MCG/ACT inhaler USE 1 INHALATION BY MOUTH  EVERY 6 HOURS AS NEEDED FOR WHEEZING 8/13/20  Yes Automatic Reconciliation, Ar   diclofenac (VOLTAREN) 75 MG EC tablet Take 1 tablet by mouth daily 1/13/22  Yes Automatic Reconciliation, Ar   ergocalciferol (ERGOCALCIFEROL) 1.25 MG (09483 UT) capsule Take 1 capsule by mouth once a week   Yes Automatic Reconciliation, Ar   fluticasone (FLONASE) 50 MCG/ACT nasal spray 2 sprays by Nasal route daily 8/13/20  Yes Automatic Reconciliation, Ar   hydroCHLOROthiazide (MICROZIDE) 12.5 MG capsule Take 1 capsule by mouth daily 12/5/20  Yes Automatic Reconciliation, Ar   montelukast (SINGULAIR) 10 MG tablet Take 1 tablet by mouth daily 12/5/20  Yes Automatic Reconciliation, Ar   tiZANidine (ZANAFLEX) 2 MG tablet Take by mouth 2 times daily as needed 7/21/21  Yes Automatic

## 2025-06-09 NOTE — PERIOP NOTE
Obi Flores  1955  [id number]    Situation:  Verbal report given from: TAMMIE Estrada CRNA, RN  Procedure: Procedure(s):  LEFT EYE CATARACT EXTRACTION WITH INTRAOCULAR LENS    Background:    Preoperative diagnosis: Age-related nuclear cataract of left eye [H25.12]    Postoperative diagnosis: * No post-op diagnosis entered *    :  Dr. Mata    Assistant(s): Circulator: Patti Del Cid RN  Scrub Person First: Zully Moreira RN    Specimens: * No specimens in log *    Assessment:  Intra-procedure medications         Anesthesia gave intra-procedure sedation and medications, see anesthesia flow sheet     Intravenous fluids: LR@ KVO     Vital signs stable       Recommendation:        
Permission received to review discharge instructions and discuss private health information with Shaylee, patient's wife.    Patient states family/friend will be with them for 24 hours following procedure.     Warm blanket applied for patient comfort.   
Pt tolerating liquids, vss.  Pt denies pain.  D/c instructions reviewed with pt's wife, Shaylee.  All questions answered.  Reviewed when to call the doctor,diet, activity and hygiene.  Reviewed when/what to take for pain, when to start drops and be careful walking around.    1027-Pt transported via w/c to awaiting transportation.  No complaints noted at time of d/c home.  
understanding.         ___________________      ___________________      ________________  (Signature of Patient)          (Witness)                   (Date and Time)

## 2025-07-12 ENCOUNTER — HOSPITAL ENCOUNTER (EMERGENCY)
Facility: HOSPITAL | Age: 70
Discharge: HOME | End: 2025-07-13
Attending: STUDENT IN AN ORGANIZED HEALTH CARE EDUCATION/TRAINING PROGRAM
Payer: MEDICARE

## 2025-07-12 ENCOUNTER — APPOINTMENT (OUTPATIENT)
Facility: HOSPITAL | Age: 70
End: 2025-07-12
Payer: MEDICARE

## 2025-07-12 DIAGNOSIS — S22.42XA CLOSED FRACTURE OF MULTIPLE RIBS OF LEFT SIDE, INITIAL ENCOUNTER: Primary | ICD-10-CM

## 2025-07-12 LAB
ALBUMIN SERPL-MCNC: 3.9 G/DL (ref 3.5–5)
ALBUMIN/GLOB SERPL: 1.2 (ref 1.1–2.2)
ALP SERPL-CCNC: 86 U/L (ref 45–117)
ALT SERPL-CCNC: 57 U/L (ref 12–78)
ANION GAP SERPL CALC-SCNC: 6 MMOL/L (ref 2–12)
AST SERPL-CCNC: 42 U/L (ref 15–37)
BASOPHILS # BLD: 0.02 K/UL (ref 0–0.1)
BASOPHILS NFR BLD: 0.2 % (ref 0–1)
BILIRUB SERPL-MCNC: 1.3 MG/DL (ref 0.2–1)
BUN SERPL-MCNC: 11 MG/DL (ref 6–20)
BUN/CREAT SERPL: 11 (ref 12–20)
CALCIUM SERPL-MCNC: 9.1 MG/DL (ref 8.5–10.1)
CHLORIDE SERPL-SCNC: 100 MMOL/L (ref 97–108)
CO2 SERPL-SCNC: 28 MMOL/L (ref 21–32)
COMMENT:: NORMAL
CREAT SERPL-MCNC: 0.98 MG/DL (ref 0.7–1.3)
DIFFERENTIAL METHOD BLD: ABNORMAL
EOSINOPHIL # BLD: 0.04 K/UL (ref 0–0.4)
EOSINOPHIL NFR BLD: 0.4 % (ref 0–7)
ERYTHROCYTE [DISTWIDTH] IN BLOOD BY AUTOMATED COUNT: 10.9 % (ref 11.5–14.5)
GLOBULIN SER CALC-MCNC: 3.2 G/DL (ref 2–4)
GLUCOSE SERPL-MCNC: 118 MG/DL (ref 65–100)
HCT VFR BLD AUTO: 41.4 % (ref 36.6–50.3)
HGB BLD-MCNC: 13.8 G/DL (ref 12.1–17)
IMM GRANULOCYTES # BLD AUTO: 0.03 K/UL (ref 0–0.04)
IMM GRANULOCYTES NFR BLD AUTO: 0.3 % (ref 0–0.5)
LYMPHOCYTES # BLD: 1.92 K/UL (ref 0.8–3.5)
LYMPHOCYTES NFR BLD: 20 % (ref 12–49)
MCH RBC QN AUTO: 33.9 PG (ref 26–34)
MCHC RBC AUTO-ENTMCNC: 33.3 G/DL (ref 30–36.5)
MCV RBC AUTO: 101.7 FL (ref 80–99)
MONOCYTES # BLD: 1.08 K/UL (ref 0–1)
MONOCYTES NFR BLD: 11.2 % (ref 5–13)
NEUTS SEG # BLD: 6.52 K/UL (ref 1.8–8)
NEUTS SEG NFR BLD: 67.9 % (ref 32–75)
NRBC # BLD: 0 K/UL (ref 0–0.01)
NRBC BLD-RTO: 0 PER 100 WBC
NT PRO BNP: 46 PG/ML
PLATELET # BLD AUTO: 250 K/UL (ref 150–400)
PMV BLD AUTO: 9 FL (ref 8.9–12.9)
POTASSIUM SERPL-SCNC: 3.9 MMOL/L (ref 3.5–5.1)
PROT SERPL-MCNC: 7.1 G/DL (ref 6.4–8.2)
RBC # BLD AUTO: 4.07 M/UL (ref 4.1–5.7)
SODIUM SERPL-SCNC: 134 MMOL/L (ref 136–145)
SPECIMEN HOLD: NORMAL
TROPONIN I SERPL HS-MCNC: 8 NG/L (ref 0–76)
WBC # BLD AUTO: 9.6 K/UL (ref 4.1–11.1)

## 2025-07-12 PROCEDURE — 83880 ASSAY OF NATRIURETIC PEPTIDE: CPT

## 2025-07-12 PROCEDURE — 80053 COMPREHEN METABOLIC PANEL: CPT

## 2025-07-12 PROCEDURE — 84484 ASSAY OF TROPONIN QUANT: CPT

## 2025-07-12 PROCEDURE — 71046 X-RAY EXAM CHEST 2 VIEWS: CPT

## 2025-07-12 PROCEDURE — 85025 COMPLETE CBC W/AUTO DIFF WBC: CPT

## 2025-07-12 PROCEDURE — 93005 ELECTROCARDIOGRAM TRACING: CPT | Performed by: STUDENT IN AN ORGANIZED HEALTH CARE EDUCATION/TRAINING PROGRAM

## 2025-07-12 PROCEDURE — 99285 EMERGENCY DEPT VISIT HI MDM: CPT

## 2025-07-12 RX ORDER — OXYCODONE HYDROCHLORIDE 5 MG/1
5 TABLET ORAL ONCE
Refills: 0 | Status: COMPLETED | OUTPATIENT
Start: 2025-07-12 | End: 2025-07-13

## 2025-07-12 RX ORDER — LIDOCAINE 4 G/G
1 PATCH TOPICAL
Status: DISCONTINUED | OUTPATIENT
Start: 2025-07-12 | End: 2025-07-13 | Stop reason: HOSPADM

## 2025-07-12 ASSESSMENT — PAIN - FUNCTIONAL ASSESSMENT
PAIN_FUNCTIONAL_ASSESSMENT: 0-10
PAIN_FUNCTIONAL_ASSESSMENT: PREVENTS OR INTERFERES SOME ACTIVE ACTIVITIES AND ADLS

## 2025-07-12 ASSESSMENT — PAIN DESCRIPTION - PAIN TYPE: TYPE: ACUTE PAIN

## 2025-07-12 ASSESSMENT — PAIN DESCRIPTION - DESCRIPTORS: DESCRIPTORS: ACHING;SHOOTING;TENDER;THROBBING

## 2025-07-12 ASSESSMENT — PAIN SCALES - GENERAL: PAINLEVEL_OUTOF10: 10

## 2025-07-12 ASSESSMENT — PAIN DESCRIPTION - ORIENTATION: ORIENTATION: RIGHT;LEFT

## 2025-07-12 ASSESSMENT — PAIN DESCRIPTION - ONSET: ONSET: ON-GOING

## 2025-07-12 ASSESSMENT — PAIN DESCRIPTION - FREQUENCY: FREQUENCY: CONTINUOUS

## 2025-07-12 ASSESSMENT — PAIN DESCRIPTION - LOCATION: LOCATION: RIB CAGE

## 2025-07-13 VITALS
SYSTOLIC BLOOD PRESSURE: 173 MMHG | TEMPERATURE: 98.6 F | HEIGHT: 71 IN | RESPIRATION RATE: 16 BRPM | OXYGEN SATURATION: 99 % | HEART RATE: 68 BPM | DIASTOLIC BLOOD PRESSURE: 66 MMHG | WEIGHT: 247.58 LBS | BODY MASS INDEX: 34.66 KG/M2

## 2025-07-13 LAB
EKG ATRIAL RATE: 78 BPM
EKG DIAGNOSIS: NORMAL
EKG P AXIS: 62 DEGREES
EKG P-R INTERVAL: 182 MS
EKG Q-T INTERVAL: 406 MS
EKG QRS DURATION: 88 MS
EKG QTC CALCULATION (BAZETT): 462 MS
EKG R AXIS: 66 DEGREES
EKG T AXIS: 24 DEGREES
EKG VENTRICULAR RATE: 78 BPM

## 2025-07-13 PROCEDURE — 93010 ELECTROCARDIOGRAM REPORT: CPT | Performed by: INTERNAL MEDICINE

## 2025-07-13 PROCEDURE — 6370000000 HC RX 637 (ALT 250 FOR IP): Performed by: STUDENT IN AN ORGANIZED HEALTH CARE EDUCATION/TRAINING PROGRAM

## 2025-07-13 RX ORDER — LIDOCAINE 50 MG/G
1 PATCH TOPICAL DAILY
Qty: 10 PATCH | Refills: 0 | Status: SHIPPED | OUTPATIENT
Start: 2025-07-13 | End: 2025-07-23

## 2025-07-13 RX ADMIN — OXYCODONE 5 MG: 5 TABLET ORAL at 00:03

## 2025-07-13 ASSESSMENT — PAIN DESCRIPTION - ORIENTATION: ORIENTATION: LEFT

## 2025-07-13 ASSESSMENT — PAIN - FUNCTIONAL ASSESSMENT: PAIN_FUNCTIONAL_ASSESSMENT: PREVENTS OR INTERFERES SOME ACTIVE ACTIVITIES AND ADLS

## 2025-07-13 ASSESSMENT — PAIN SCALES - GENERAL: PAINLEVEL_OUTOF10: 10

## 2025-07-13 ASSESSMENT — PAIN DESCRIPTION - DESCRIPTORS: DESCRIPTORS: SHARP

## 2025-07-13 ASSESSMENT — PAIN DESCRIPTION - LOCATION: LOCATION: RIB CAGE

## 2025-07-13 NOTE — ED PROVIDER NOTES
HISTORY OF PRESENT ILLNESS  70-year-old male with a history of hypertension and allergy to gabapentin presents with shortness of breath and chest wall pain following multiple falls, including two at home (one coming out of the basement/laundromat) and one in the emergency department while attempting to go to the bathroom. He was previously diagnosed with left-sided rib fractures at [facility] emergency department and has a recently sutured hematoma to the left forehead. He reports inability to cough, pain with chest bending, and difficulty taking deep breaths due to pain; he has not yet received prescribed pain medication but has been taking acetaminophen. He denies asthma, cigarette use, and recreational drug use; diabetes status is unclear, and there is possible alcohol use. Denies vomiting, diarrhea, or recent travel.    HEALTHCARE PROVIDERS  - [facility] emergency department (rib fractures diagnosed)    PAST MEDICAL HISTORY  - History of hypertension.    PAST SURGICAL HISTORY  - Recent laceration to left forehead, sutured without complication.    SOCIAL HISTORY  - Denies tobacco and recreational drug use  - Possible alcohol use    PHYSICAL EXAM  Vitals: Interpreted as normal for this patient.  General: Mild distress secondary to pain.  Eyes: Appear normal with no scleral icterus.  HENT: Atraumatic. Moist mucous membranes, no pharyngeal erythema, edema or lesions.  Neck: Atraumatic, supple.  Cardiac: Unremarkable cardiac exam.  Respiratory: Difficulty taking a deep breath due to pain; lungs clear throughout, unlabored, bilateral.  Abdomen: Soft. Nontender. Nondistended. No rebound. No guarding. No tenderness over McBurney's point, no tenderness over the liver or spleen, no Sanches's sign, no pulsatile abdominal mass.  : No CVAT.  MS: Extremities atraumatic. No edema, no calf tenderness to palpation.  Skin: Hematoma to the left forehead, recently sutured, well-appearing,

## 2025-07-13 NOTE — ED TRIAGE NOTES
Pt ambulatory to ED w/ c/o SOB, chest pain and l. Rib pain. Pt seen last night at [place] after multiple falls, dx w/ multiple rib fractures and sent home w/ oxycodone. Pt denies filling prescription. Pt c/o worsening pain w/ deep breathing. Sent home w/ incentive spirometer that pt states he used once today. Pt states falls yesterday were caused by \"having a little too much to drink\". Denies alcohol use today or new falls/trauma.

## 2025-07-13 NOTE — ED NOTES
Patient discharged by MD Pako - pt sent to the front lobby with family, with strong and steady gait, no acute distress noted at time of discharge - Discharge information / home RX / and reasons to return to the ED were reviewed by the ED provider.

## (undated) DEVICE — SURGICAL PROCEDURE PACK CATRCT CUST

## (undated) DEVICE — NEEDLE SCLERO 23GA L4MM CATH L240CM CNTRST SHTH DIA1.8MM

## (undated) DEVICE — BW-412T DISP COMBO CLEANING BRUSH: Brand: SINGLE USE COMBINATION CLEANING BRUSH

## (undated) DEVICE — SOLUTION IRRG STRL H2O 500 ML BTL 16/CA

## (undated) DEVICE — REM POLYHESIVE ADULT PATIENT RETURN ELECTRODE: Brand: VALLEYLAB

## (undated) DEVICE — THE MONARCH® "D" CARTRIDGE IS A SINGLE-USE POLYPROPYLENE CARTRIDGE FOR POSTERIOR CHAMBER IOL DELIVERY: Brand: MONARCH® III

## (undated) DEVICE — GLOVE SURG SZ 75 CRM LTX FREE POLYISOPRENE POLYMER BEAD ANTI

## (undated) DEVICE — BAG SPEC BIOHZD LF 2MIL 6X10IN -- CONVERT TO ITEM 357326

## (undated) DEVICE — CONTAINER SPEC 20 ML LID NEUT BUFF FORMALIN 10 % POLYPR STS

## (undated) DEVICE — SYR 50ML SLIP TIP NSAF LF STRL --

## (undated) DEVICE — TOWEL,OR,DSP,ST,BLUE,STD,4/PK,20PK/CS: Brand: MEDLINE

## (undated) DEVICE — FORCEPS BX L240CM JAW DIA2.8MM L CAP W/ NDL MIC MESH TOOTH

## (undated) DEVICE — ENDO CARRY-ON PROCEDURE KIT INCLUDES ENZYMATIC SPONGE, GAUZE, BIOHAZARD LABEL, TRAY, LUBRICANT, DIRTY SCOPE LABEL, WATER LABEL, TRAY, DRAWSTRING PAD, AND DEFENDO 4-PIECE KIT.: Brand: ENDO CARRY-ON PROCEDURE KIT

## (undated) DEVICE — BAG BELONG PT PERS CLEAR HANDL

## (undated) DEVICE — SYRINGE MEDICAL 3ML CLEAR PLASTIC STANDARD NON CONTROL LUERLOCK TIP DISPOSABLE

## (undated) DEVICE — AGENT OPHTH SURG DUOVISC 30MG/ML NAHA 0.35ML OR 0.5ML

## (undated) DEVICE — Z DISCONTINUED NO SUB IDED SET EXTN W/ 4 W STPCOCK M SPIN LOK 36IN

## (undated) DEVICE — SYRINGE MED 30ML STD CLR PLAS LUERLOCK TIP N CTRL DISP

## (undated) DEVICE — NEEDLE HYPO 18GA L1.5IN PNK S STL HUB POLYPR SHLD REG BVL

## (undated) DEVICE — GLOVE ORANGE PI 7   MSG9070

## (undated) DEVICE — SURGICAL PROCEDURE PACK EYE CUST DR CHNDLR

## (undated) DEVICE — CANN NASAL O2 CAPNOGRAPHY AD -- FILTERLINE

## (undated) DEVICE — Device: Brand: MEDICAL ACTION INDUSTRIES

## (undated) DEVICE — SNARE ENDOSCP M L240CM W27MM SHTH DIA2.4MM CHN 2.8MM OVL

## (undated) DEVICE — SOLIDIFIER FLUID 3000 CC ABSORB

## (undated) DEVICE — 1200 GUARD II KIT W/5MM TUBE W/O VAC TUBE: Brand: GUARDIAN

## (undated) DEVICE — SET ADMIN 16ML TBNG L100IN 2 Y INJ SITE IV PIGGY BK DISP

## (undated) DEVICE — CATH IV AUTOGRD BC BLU 22GA 25 -- INSYTE

## (undated) DEVICE — QUILTED PREMIUM COMFORT UNDERPAD,EXTRA HEAVY: Brand: WINGS

## (undated) DEVICE — TRAP SURG QUAD PARABOLA SLOT DSGN SFTY SCRN TRAPEASE

## (undated) DEVICE — STAIN INDIA INK IN NACL 10ML --

## (undated) DEVICE — Device

## (undated) DEVICE — SYRINGE MED 20ML STD CLR PLAS LUERLOCK TIP N CTRL DISP

## (undated) DEVICE — CONNECTOR TBNG AUX H2O JET DISP FOR OLY 160/180 SER

## (undated) DEVICE — AIRLIFE™ U/CONNECT-IT OXYGEN TUBING 7 FEET (2.1 M) CRUSH-RESISTANT OXYGEN TUBING, VINYL TIPPED: Brand: AIRLIFE™

## (undated) DEVICE — KENDALL RADIOLUCENT FOAM MONITORING ELECTRODE -RECTANGULAR SHAPE: Brand: KENDALL

## (undated) DEVICE — Z DISCONTINUED USE 2751540 TUBING IRRIG L10IN DISP PMP ENDOGATOR